# Patient Record
Sex: FEMALE | Race: WHITE | NOT HISPANIC OR LATINO | URBAN - METROPOLITAN AREA
[De-identification: names, ages, dates, MRNs, and addresses within clinical notes are randomized per-mention and may not be internally consistent; named-entity substitution may affect disease eponyms.]

---

## 2023-10-28 RX ORDER — CHOLECALCIFEROL (VITAMIN D3) 25 MCG
1 TABLET ORAL DAILY
COMMUNITY

## 2023-10-30 ENCOUNTER — OFFICE VISIT (OUTPATIENT)
Dept: PRIMARY CARE | Facility: CLINIC | Age: 43
End: 2023-10-30
Payer: COMMERCIAL

## 2023-10-30 VITALS
OXYGEN SATURATION: 100 % | BODY MASS INDEX: 25.84 KG/M2 | HEART RATE: 106 BPM | TEMPERATURE: 97.8 F | WEIGHT: 174.5 LBS | HEIGHT: 69 IN | SYSTOLIC BLOOD PRESSURE: 118 MMHG | RESPIRATION RATE: 20 BRPM | DIASTOLIC BLOOD PRESSURE: 78 MMHG

## 2023-10-30 DIAGNOSIS — Z13.6 ENCOUNTER FOR SCREENING FOR CORONARY ARTERY DISEASE: ICD-10-CM

## 2023-10-30 DIAGNOSIS — R00.2 HEART PALPITATIONS: ICD-10-CM

## 2023-10-30 DIAGNOSIS — Z12.31 ENCOUNTER FOR SCREENING MAMMOGRAM FOR MALIGNANT NEOPLASM OF BREAST: ICD-10-CM

## 2023-10-30 DIAGNOSIS — Z23 ENCOUNTER FOR ADMINISTRATION OF VACCINE: ICD-10-CM

## 2023-10-30 DIAGNOSIS — Z13.1 DIABETES MELLITUS SCREENING: ICD-10-CM

## 2023-10-30 DIAGNOSIS — Z11.3 ROUTINE SCREENING FOR STI (SEXUALLY TRANSMITTED INFECTION): ICD-10-CM

## 2023-10-30 DIAGNOSIS — Z65.9 OTHER SOCIAL STRESSOR: ICD-10-CM

## 2023-10-30 DIAGNOSIS — E04.9 ENLARGED THYROID: ICD-10-CM

## 2023-10-30 DIAGNOSIS — Z71.85 IMMUNIZATION COUNSELING: ICD-10-CM

## 2023-10-30 DIAGNOSIS — Z76.89 ENCOUNTER TO ESTABLISH CARE: Primary | ICD-10-CM

## 2023-10-30 DIAGNOSIS — Z13.220 LIPID SCREENING: ICD-10-CM

## 2023-10-30 DIAGNOSIS — Z11.59 ENCOUNTER FOR HEPATITIS C SCREENING TEST FOR LOW RISK PATIENT: ICD-10-CM

## 2023-10-30 PROCEDURE — 99214 OFFICE O/P EST MOD 30 MIN: CPT | Performed by: FAMILY MEDICINE

## 2023-10-30 PROCEDURE — 4004F PT TOBACCO SCREEN RCVD TLK: CPT | Performed by: FAMILY MEDICINE

## 2023-10-30 PROCEDURE — 93000 ELECTROCARDIOGRAM COMPLETE: CPT | Performed by: FAMILY MEDICINE

## 2023-10-30 PROCEDURE — 90471 IMMUNIZATION ADMIN: CPT | Performed by: FAMILY MEDICINE

## 2023-10-30 PROCEDURE — 93005 ELECTROCARDIOGRAM TRACING: CPT | Performed by: FAMILY MEDICINE

## 2023-10-30 RX ORDER — VITAMIN E MIXED 400 UNIT
CAPSULE ORAL DAILY
COMMUNITY

## 2023-10-30 ASSESSMENT — COLUMBIA-SUICIDE SEVERITY RATING SCALE - C-SSRS
6. HAVE YOU EVER DONE ANYTHING, STARTED TO DO ANYTHING, OR PREPARED TO DO ANYTHING TO END YOUR LIFE?: NO
1. IN THE PAST MONTH, HAVE YOU WISHED YOU WERE DEAD OR WISHED YOU COULD GO TO SLEEP AND NOT WAKE UP?: NO
2. HAVE YOU ACTUALLY HAD ANY THOUGHTS OF KILLING YOURSELF?: NO

## 2023-10-30 ASSESSMENT — PATIENT HEALTH QUESTIONNAIRE - PHQ9
SUM OF ALL RESPONSES TO PHQ9 QUESTIONS 1 AND 2: 0
1. LITTLE INTEREST OR PLEASURE IN DOING THINGS: NOT AT ALL
2. FEELING DOWN, DEPRESSED OR HOPELESS: NOT AT ALL

## 2023-10-30 ASSESSMENT — PAIN SCALES - GENERAL: PAINLEVEL: 0-NO PAIN

## 2023-10-30 ASSESSMENT — ENCOUNTER SYMPTOMS
OCCASIONAL FEELINGS OF UNSTEADINESS: 0
LOSS OF SENSATION IN FEET: 0
DEPRESSION: 0

## 2023-10-30 NOTE — PROGRESS NOTES
Subjective   Estefania Hobbs is a 43 y.o. female who presents as a NEW PATIENT as a transfer of PCP care from Dr. Wilson TO ESTABLISH PCP care with complaints of HEART PALPITATIONS. for Palpitations.    HPI:      42 yo female CURRENT SMOKER (recently 1/2 ppd x 2 months;  1/2 ppd x 10 yeats= 5 pack-years; restarted smoking 2 months ago)  NEW PATIENT as a transfer of PCP care from Dr. Wilson TO ESTABLISH PCP care with complaints of HEART PALPITATIONS.     Last seen by Dr. Wilson 2/2/23 for a PHYSICAL and PAP SMEAR. At visit:  -ordered labs (CBC, CMP, lipid panel, Ucx)  -ordered mammogram==>  -pap completed and sent  -for UTI, PRESCRIBED keflex    After visit 2/2/23 based on test results:  -CBC grossly normal other elevated monocyte %  -lipid panel WNL  -Ucx negative for UTI  -pap cytology: negative for intraepithelial lesion or malignancy.  Cellular evidence parakeratosis usually self-limiting, negative for HPV, continue routine follow-up    Mammogram 3/14/23: Stable mammograms without features to indicate malignancy. Screening  mammography in 1 year is recommended.     ACR BI-RADS 2:  Benign finding.      PMHx:  -dyspareunia==> referred to OBGYN Dr. Staley 2/2/23  -UTI  -history HR HPV    Healthcare Providers:  -prior PCP: Dr. Wilson      Preventive Health Services:  -Last physical: 2/2/23; NEXT DUE 2/2024  -last mammogram: 3/14/23: Stable mammograms without features to indicate malignancy. Screening;mammography in 1 year is recommended. ACR BI-RADS 2:  Benign finding.  NEXT DUE 3/2024  -last colonoscopy: NEVER; due at age 44 years old==> DUE 9/2024  -last STI screening: ?  -Immunizations: Completed Childhood vaccines,   COMPLETED COVID primary vaccine series and booster, Flu vaccine==> NOW DUE        Today Estefania reports  intermittent heart palpitations at rest x 4 years that previously occurred on average once every 2-3 months, but over past 2 -3 weeks experiencing consistent daily  heart palpitations both at  rest and with activity, increasing in frequency and duration over time. She denies that heart palpitations are associated with N/V, diaphoresis, CP, dizziness, confusion, SOB, or COUGH. She denies exertional dyspnea, fatigue,  LE swelling, orthopnea, history of panic attacks, or family history of early AMI or sudden cardiac death.    She also reports:  -previously drinking 6 cups of coffee, but weeks ago but back to 3 cups of coffee since was unsure if coffee intake was influencing heart palpitations. She reports that since cutting back on coffee intake, frequency of heart palpitations remains unchanged.      -increased social stressors x 2 months, due to issues with 16 yo son (grades, not listening, drinking beer).  She states that dynamic in the house is very uncomfortable since her son is not on speaking terms with her  who raised her son.  She expressed that she is interested in referral to psychiatry for therapy to help cope with stress.      Estefania reports no other complaints, issues, or problems today    ROS is NEG for HEADACHE, NAUSEA, VOMITING, DIARRHEA, CHEST PAIN, SOB, and BLEEDING Review of systems is negative for all systems except for any identified issues in HPI above.    LMP: 10/10/23  Contraception: use condoms 100% of the time;   Sexually active with  x 13 years.   Denies history STIs.       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[employer]; [facility] [employer] Disabilities  -tobacco use: CURRENT SMOKER   -alcohol use: socially; 2-3 drinks (beer or wine) once time weekly  -illicits: DENIES    FHx:  Cancer:  -breast cancer: mother, passed in her 50s of metastatic breast cancer   HTN: father  DM: paternal aunts  Heart Disease: father (CAD) s/p stents; mother; maternal  uncles x 3 heart attacks (60s)  Stroke: DENIES  Thyroid Disease: DENIES              Objective     /78   Pulse 106   Temp 36.6 °C (97.8 °F)   Resp 20   Ht  "1.753 m (5' 9\")   Wt 79.2 kg (174 lb 8 oz)   SpO2 100%   BMI 25.77 kg/m²        EK bpm, NSR. No ischemic changes.     COMPLETE PHYSICAL EXAM    GENERAL           General Appearance: pleasant, calm, well-appearing, well-developed, well-hydrated, well-nourished, well-groomed        HEENT           NECK supple, DIFFUSELY ENLARGED THYROID, THYROID NON-TENDER TO PALPABLE, NO THYROID PALPABLE MASSES OR NODULES, Neg for adneopathy Oropharynx normal no exudates. No JVD.       EYES           Pupils: PERRLA, no photophobia.        HEART           Rate and Rhythm regular rate and rhythm. Heart sounds: normal S1S2. Murmurs: none.        LUNGS           Effort: Normal chest wall, no pectus, Normal air entry all fields, Clear to IPPA, RR<16 with no use of accessory muscles.           ABDOMEN           General: Normal to inspection, neg for LKKS or masses and BSs heard in all quadrants                 MUSCULOSKELETAL           gross abnormalities no gross abnormalities, no joint redness or swelling.        EXTREMITIES           Varicose veins: not present. Pulses: 2+ bilateral. Clubbing: none. Cyanosis: no.        NEUROLOGICAL           Orientation: alert and oriented x 3. Grossly normal: yes. Plantars: downgoing bilaterally. Muscle Bulk: normal . Cranial Nerves: CN's II-XII grossly intact.        PSYCHOLOGY           Affect: appropriate. Mood: pleasant.     Assessment/Plan   Problem List Items Addressed This Visit    None  Visit Diagnoses       Encounter to establish care    -  Primary    Relevant Orders    CBC and Auto Differential    Comprehensive metabolic panel    Tsh With Reflex To Free T4 If Abnormal    Urinalysis with Reflex Microscopic    Encounter for screening mammogram for malignant neoplasm of breast        Heart palpitations        Relevant Orders    CBC and Auto Differential    Comprehensive metabolic panel    Tsh With Reflex To Free T4 If Abnormal    Lipid panel    ECG 12 lead (Clinic Performed) (Completed) "    Referral to Cardiology    Lipid screening        Relevant Orders    Lipid panel    Diabetes mellitus screening        Relevant Orders    Hemoglobin A1c    Encounter for hepatitis C screening test for low risk patient        Relevant Orders    Hepatitis C antibody    Routine screening for STI (sexually transmitted infection)        Relevant Orders    HIV 1/2 Antigen/Antibody Screen with Reflex to Confirmation    Syphilis Screen with Reflex    C. trachomatis / N. gonorrhoeae, DNA probe    Trichomonas vaginalis, Amplified    Immunization counseling        Relevant Orders    Flu vaccine (IIV4) age 6 months and greater, preservative free (Completed)    Encounter for screening for coronary artery disease        Relevant Orders    CT cardiac scoring wo IV contrast    Encounter for administration of vaccine        Relevant Orders    Flu vaccine (IIV4) age 6 months and greater, preservative free (Completed)    Other social stressor        Relevant Orders    Referral to Psychiatry    Enlarged thyroid        Relevant Orders    US thyroid          Encounter to Establish care  -labs ordered (CBC, CMP, TSH, lipid panel, HgBA1c, urinalysis; see above)    Heart Palpitations:  -labs ordered (see above: CBC, CMP, TSH, etc)  -EKG ordered/completed: EK bpm, NSR. No ischemic changes.   -CT Heart Ca scoring ordered  -referral to cardiology Dr. Vallabehini ordered  -encouraged to limit caffeine intake  -Emergency Dept and 911/EMS precautions discussed and reviewed    Enlarged thyroid:  -TSH/T4 ordered  -thyroid US ordered    Social stressors:  -referral to  psychiatry  -discussed if has difficulty being seen at  psychiatry or if does nbot accept her insurance, and she prefers to schedule at::  1) [facility] [id number]  OR  2) [facility]: [phone]  -will continue to monitor    Lipid screening  -lipid panel ordered    Diabetes Screening  -hemoglobin A1c ordered    Hep C screening  -Hep C antibody  ordered    Breast Cancer Screening UTD  -mammogram up to date; NEXT DUE 3/2024    STI Screening  -HIV, syphilis, GC/CT, trich ordered    Immunization Counseling  -flu vaccine now now==>RECEIVED TODAY  -recommend updated COVID vaccine that can be obtained at local pharmacy  -TDAP==>? Will discuss at follow up visit    FOLLOW-UP: 4 weeks to discuss and review test results           Bebe Lemus MD, PhD

## 2023-11-09 ENCOUNTER — HOSPITAL ENCOUNTER (OUTPATIENT)
Dept: RADIOLOGY | Facility: HOSPITAL | Age: 43
Discharge: HOME | End: 2023-11-09
Payer: COMMERCIAL

## 2023-11-09 DIAGNOSIS — E04.9 ENLARGED THYROID: ICD-10-CM

## 2023-11-09 PROCEDURE — 76536 US EXAM OF HEAD AND NECK: CPT

## 2023-11-14 ENCOUNTER — HOSPITAL ENCOUNTER (OUTPATIENT)
Dept: RADIOLOGY | Facility: HOSPITAL | Age: 43
Discharge: HOME | End: 2023-11-14
Payer: COMMERCIAL

## 2023-11-14 DIAGNOSIS — Z13.6 ENCOUNTER FOR SCREENING FOR CORONARY ARTERY DISEASE: ICD-10-CM

## 2023-11-14 PROCEDURE — 75571 CT HRT W/O DYE W/CA TEST: CPT

## 2024-01-09 ENCOUNTER — APPOINTMENT (OUTPATIENT)
Dept: PRIMARY CARE | Facility: CLINIC | Age: 44
End: 2024-01-09
Payer: COMMERCIAL

## 2024-01-09 NOTE — PROGRESS NOTES
Subjective   Estefania Hobbs is a 43 y.o. female who presents for FOLLOW-UP VISIT. No chief complaint on file..    HPI:      44 yo female CURRENT SMOKER (recently 1/2 ppd x 2 months;  1/2 ppd x 10 yeats= 5 pack-years; restarted smoking 2 months ago) presenting for FOLLOW-UP VISIT    Last seen by me on 10/30/23 as a NEW PATIENT VISIT as a transfer of PCP care from Dr. Wilson TO ESTABLISH PCP care with complaints of HEART PALPITATIONS. At visit:  -ORDERED LABS==> NOT YET COMPLETED  -EK bpm, NSR. No ischemic changes.    -ORDERED CT Heart Ca scoring==> COMPLETED 23  -ORDERED referral to cardiology Dr. Vallabehini==>  -ORDERED thyroid US for enlarged thyroid==> COMPLETED 23  -ORDERED referral to  psychiatry for multiple social stressors  -discussed if has difficulty being seen at  psychiatry or if does not accept her insurance, and she prefers to schedule at::  1) [email or web address] [phone]  OR  2) [facility]: [phone]  -encouraged to limit caffeine intake  -Emergency Dept and 911/EMS precautions discussed and reviewed  -RECEIVED FLU vaccine  -TDAP==>? Will discuss at follow up visit  FOLLOW-UP: 4 weeks to discuss and review test results      After visit on 10/20/23 based on test results:    -CT Heart Ca scoring (23) :   Coronary calcium score =0;  very low risk of coronary artery disease     -Thyroid US(23):   Slightly increased vascularity of the thyroid gland bilaterally;  otherwise unremarkable exam==>Will repeat thyroid US in 6-12 months to monitor; NEXT DUE 2024        Last seen by Dr. Wilson 23 for a PHYSICAL and PAP SMEAR. At visit:  -ordered labs (CBC, CMP, lipid panel, Ucx)  -ordered mammogram==>  -pap completed and sent  -for UTI, PRESCRIBED keflex     After visit 23 based on test results:  -CBC grossly normal other elevated monocyte %  -lipid panel WNL  -Ucx negative for UTI  -pap cytology: negative for intraepithelial lesion or malignancy.   Cellular evidence parakeratosis usually self-limiting, negative for HPV, continue routine follow-up     Mammogram 3/14/23: Stable mammograms without features to indicate malignancy. Screening  mammography in 1 year is recommended.     ACR BI-RADS 2:  Benign finding.        PMHx:  -dyspareunia==> referred to OBGYN Dr. Staley 2/2/23  -UTI  -history HR HPV  -social sfrtessors==> previously referred to psychiatry 10/20/23     Healthcare Providers:  -prior PCP: Dr. Wilson        Preventive Health Services:  -Last physical: 2/2/23; NEXT DUE 2/2024  -last mammogram: 3/14/23: Stable mammograms without features to indicate malignancy. Screening;mammography in 1 year is recommended. ACR BI-RADS 2:  Benign finding.  NEXT DUE 3/2024  -last colonoscopy: NEVER; due at age 44 years old==> DUE 9/2024  -last STI screening: ?  -Immunizations: Completed Childhood vaccines,   COMPLETED COVID primary vaccine series and booster, Flu vaccine==> RECEIVED 10/30/23       Immunization History   Administered Date(s) Administered    Flu vaccine (IIV4), preservative free *Check age/dose* 10/15/2022, 10/30/2023    Hepatitis B vaccine, adult (RECOMBIVAX, ENGERIX) 09/18/2008, 10/23/2008, 03/12/2009    Influenza, injectable, quadrivalent 10/23/2020, 11/23/2021    Pfizer COVID-19 vaccine, bivalent, age 12 years and older (30 mcg/0.3 mL) 10/15/2022    Pfizer Purple Cap SARS-CoV-2 04/06/2021, 05/04/2021, 11/26/2021           INTERVAL HISTORY:        Today Estefania reports:      intermittent heart palpitations at rest x 4 years that previously occurred on average once every 2-3 months, but over past 2 -3 weeks experiencing consistent daily  heart palpitations both at rest and with activity, increasing in frequency and duration over time. She denies that heart palpitations are associated with N/V, diaphoresis, CP, dizziness, confusion, SOB, or COUGH. She denies exertional dyspnea, fatigue,  LE swelling, orthopnea, history of panic attacks, or family  history of early AMI or sudden cardiac death.     -previously drinking 6 cups of coffee, but weeks ago but back to 3 cups of coffee since was unsure if coffee intake was influencing heart palpitations. She reports that since cutting back on coffee intake, frequency of heart palpitations remains unchanged.        -increased social stressors x 2 months, due to issues with 16 yo son (grades, not listening, drinking beer).  She states that dynamic in the house is very uncomfortable since her son is not on speaking terms with her  who raised her son.  She expressed that she is interested in referral to psychiatry for therapy to help cope with stress.        Estefania reports no other complaints, issues, or problems today     ROS is NEG for HEADACHE, NAUSEA, VOMITING, DIARRHEA, CHEST PAIN, SOB, and BLEEDING Review of systems is negative for all systems except for any identified issues in HPI above.     LMP:   Contraception: use condoms 100% of the time;   Sexually active with  x 13 years.   Denies history STIs.         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[employer]; [facility] [employer] Disabilities  -tobacco use: CURRENT SMOKER   -alcohol use: socially; 2-3 drinks (beer or wine) once time weekly  -illicits: DENIES     FHx:  Cancer:  -breast cancer: mother, passed in her 50s of metastatic breast cancer   HTN: father  DM: paternal aunts  Heart Disease: father (CAD) s/p stents; mother; maternal  uncles x 3 heart attacks (60s)  Stroke: DENIES  Thyroid Disease: DENIES      Objective     There were no vitals taken for this visit.     Physical Examination:       GENERAL           General Appearance: well-appearing, well-developed, well-hydrated, well-nourished, no acute distress.        HEENT           NECK supple, DIFFUSELY ENLARGED THYROID, THYROID NON-TENDER TO PALPABLE, NO THYROID PALPABLE MASSES OR NODULES, Neg for adneopathy Oropharynx normal no exudates.  No JVD. no carotid bruit.        EYES           Extraocular Movements: normal, bilateral eyes PUNEET, no conjunctival injection.        HEART           Rate and Rhythm regular rate and rhythm. Heart sounds: normal S1S2, no S3 or S4. Murmurs: none.        CHEST           Breath sounds: Clear to IPPA, RR<16 no use of accessory muscles.        ABDOMEN           General: Neg for LKKS or masses, no scleral icterus or jaundice.        MUSCULOSKELETAL           Joints Demonstration: Neg for erythema, swelling or joint deformities. gross abnormalities no gross abnormalities.        EXTREMITIES           Lower Extremities: Neg for cyanosis, clubbing or edema.       Assessment/Plan   Problem List Items Addressed This Visit    None      Heart Palpitations: CT Heart Ca scoring (23)=0, low risk of CAD  -labs previously ordered (CBC, CMP, TSH, etc) 10/20/23==> NOT YET COMPLETED. Discussed with patient to please go to [facility], [place], operated by [facility] or another  lab to complete previously ordered labs.  -EKG 10/30/23: EK bpm, NSR. No ischemic changes.   -previously ordered referral to cardiology Dr. Vallabehini on 10/30/23  -encouraged to limit caffeine intake  -Emergency Dept and 911/EMS precautions discussed and reviewed     Enlarged thyroid: completed thyroid US 23  that lightly increased vascularity of the thyroid gland bilaterally;  otherwise unremarkable exax  -Will repeat thyroid US in 6-12 months to monitor; NEXT DUE 2024     Social stressors:  -referral to  psychiatry  -discussed if has difficulty being seen at  psychiatry or if does nbot accept her insurance, and she prefers to schedule at::  1) [facility] [phone]  OR  2) [facility]: [phone]  -will continue to monitor     Lipid screening  -lipid panel previously  ordered 10/30/23==>  NOT YET COMPLETED. Discussed with patient to please go to [facility], [place], operated by [facility] or another  lab to complete previously ordered labs.     Diabetes  Screening  -hemoglobin A1c previously ordered 10/30/23==> NOT YET COMPLETED. Discussed with patient to please go to [facility] or another  lab to complete previously ordered labs.     Hep C screening  -Hep C antibody ordered     Breast Cancer Screening UTD  -mammogram up to date; NEXT DUE 3/2024     STI Screening  -HIV, syphilis, GC/CT, trich previously ordered 10/30/23==> NOT YET COMPLETED. Discussed with patient to please go to [facility] or another  lab to complete previously ordered labs.     Immunization Counseling  -flu vaccine RECEIVED 10/30/23  -recommend updated COVID vaccine that can be obtained at local pharmacy  -TDAP NOW DUE==>     FOLLOW-UP: 4 weeks to discuss and review test results            Bebe Lemus MD, PhD

## 2024-02-05 ASSESSMENT — PROMIS GLOBAL HEALTH SCALE
RATE_SOCIAL_SATISFACTION: GOOD
CARRYOUT_SOCIAL_ACTIVITIES: VERY GOOD
RATE_AVERAGE_FATIGUE: MILD
EMOTIONAL_PROBLEMS: SOMETIMES
RATE_AVERAGE_PAIN: 3
RATE_MENTAL_HEALTH: GOOD
CARRYOUT_PHYSICAL_ACTIVITIES: MOSTLY
RATE_PHYSICAL_HEALTH: FAIR
RATE_GENERAL_HEALTH: GOOD
RATE_QUALITY_OF_LIFE: GOOD
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
## 2024-02-09 ENCOUNTER — OFFICE VISIT (OUTPATIENT)
Dept: PRIMARY CARE | Facility: CLINIC | Age: 44
End: 2024-02-09
Payer: COMMERCIAL

## 2024-02-09 VITALS
OXYGEN SATURATION: 100 % | WEIGHT: 177.2 LBS | SYSTOLIC BLOOD PRESSURE: 110 MMHG | DIASTOLIC BLOOD PRESSURE: 70 MMHG | HEART RATE: 84 BPM | RESPIRATION RATE: 18 BRPM | BODY MASS INDEX: 26.17 KG/M2 | TEMPERATURE: 97 F

## 2024-02-09 DIAGNOSIS — Z11.59 ENCOUNTER FOR HEPATITIS C SCREENING TEST FOR LOW RISK PATIENT: ICD-10-CM

## 2024-02-09 DIAGNOSIS — Z12.4 CERVICAL CANCER SCREENING: ICD-10-CM

## 2024-02-09 DIAGNOSIS — Z13.1 DIABETES MELLITUS SCREENING: ICD-10-CM

## 2024-02-09 DIAGNOSIS — Z12.31 ENCOUNTER FOR SCREENING MAMMOGRAM FOR MALIGNANT NEOPLASM OF BREAST: ICD-10-CM

## 2024-02-09 DIAGNOSIS — E55.9 VITAMIN D DEFICIENCY: ICD-10-CM

## 2024-02-09 DIAGNOSIS — M54.50 CHRONIC RIGHT-SIDED LOW BACK PAIN WITHOUT SCIATICA: ICD-10-CM

## 2024-02-09 DIAGNOSIS — M62.838 MUSCLE SPASM: ICD-10-CM

## 2024-02-09 DIAGNOSIS — Z71.85 IMMUNIZATION COUNSELING: ICD-10-CM

## 2024-02-09 DIAGNOSIS — M25.551 RIGHT HIP PAIN: Primary | ICD-10-CM

## 2024-02-09 DIAGNOSIS — Z00.00 PHYSICAL EXAM: ICD-10-CM

## 2024-02-09 DIAGNOSIS — G89.29 CHRONIC RIGHT-SIDED LOW BACK PAIN WITHOUT SCIATICA: ICD-10-CM

## 2024-02-09 DIAGNOSIS — Z13.220 LIPID SCREENING: ICD-10-CM

## 2024-02-09 DIAGNOSIS — Z65.9 OTHER SOCIAL STRESSOR: ICD-10-CM

## 2024-02-09 DIAGNOSIS — E04.9 ENLARGED THYROID: ICD-10-CM

## 2024-02-09 DIAGNOSIS — R00.2 HEART PALPITATIONS: ICD-10-CM

## 2024-02-09 DIAGNOSIS — Z11.3 ROUTINE SCREENING FOR STI (SEXUALLY TRANSMITTED INFECTION): ICD-10-CM

## 2024-02-09 PROCEDURE — 99214 OFFICE O/P EST MOD 30 MIN: CPT | Performed by: FAMILY MEDICINE

## 2024-02-09 PROCEDURE — 4004F PT TOBACCO SCREEN RCVD TLK: CPT | Performed by: FAMILY MEDICINE

## 2024-02-09 RX ORDER — CYCLOBENZAPRINE HCL 5 MG
5 TABLET ORAL 3 TIMES DAILY PRN
Qty: 30 TABLET | Refills: 1 | Status: SHIPPED | OUTPATIENT
Start: 2024-02-09 | End: 2024-04-09

## 2024-02-09 ASSESSMENT — ENCOUNTER SYMPTOMS
LOSS OF SENSATION IN FEET: 0
OCCASIONAL FEELINGS OF UNSTEADINESS: 0
DEPRESSION: 0

## 2024-02-09 ASSESSMENT — PATIENT HEALTH QUESTIONNAIRE - PHQ9
2. FEELING DOWN, DEPRESSED OR HOPELESS: NOT AT ALL
SUM OF ALL RESPONSES TO PHQ9 QUESTIONS 1 AND 2: 0
1. LITTLE INTEREST OR PLEASURE IN DOING THINGS: NOT AT ALL

## 2024-02-09 ASSESSMENT — PAIN SCALES - GENERAL: PAINLEVEL: 2

## 2024-02-09 NOTE — PATIENT INSTRUCTIONS
It was nice seeing you today.    Please take flexeril 5 mg as need for muscle spasms, I recommend to take at night since they can make you feel sleepy and apply heat to your hip and back with a heating pad    Please go to [facility] lab or another [facility] lab facility to complete the lab testing that I ordered     Please go to [facility] and walk in to have your xrays completed that can be done at the same time you have your labs drawn    Please call radiology to schedule your mammogram that is due Match 2024    Please call referral line to schedule: 1)  GYN appointment for pap smear and well woman visit and 2) psychiatry appointment; 3) physical therapy    I recommend receiving the TDAP booster that prevents tetanus and other infectious disease    I recommend the updated COVID vaccine that can be obtained at your local pharmacy    Please schedule a follow up with me in 4  weeks to discuss and review your test results and then 8 weeks for a physical

## 2024-02-12 ENCOUNTER — HOSPITAL ENCOUNTER (OUTPATIENT)
Dept: RADIOLOGY | Facility: HOSPITAL | Age: 44
Discharge: HOME | End: 2024-02-12

## 2024-02-12 ENCOUNTER — HOSPITAL ENCOUNTER (OUTPATIENT)
Dept: RADIOLOGY | Facility: HOSPITAL | Age: 44
Discharge: HOME | End: 2024-02-12
Payer: COMMERCIAL

## 2024-02-12 ENCOUNTER — ANCILLARY ORDERS (OUTPATIENT)
Dept: PRIMARY CARE | Facility: CLINIC | Age: 44
End: 2024-02-12

## 2024-02-12 ENCOUNTER — LAB (OUTPATIENT)
Dept: LAB | Facility: LAB | Age: 44
End: 2024-02-12
Payer: COMMERCIAL

## 2024-02-12 DIAGNOSIS — M25.551 RIGHT HIP PAIN: ICD-10-CM

## 2024-02-12 DIAGNOSIS — M54.50 CHRONIC RIGHT-SIDED LOW BACK PAIN WITHOUT SCIATICA: ICD-10-CM

## 2024-02-12 DIAGNOSIS — M25.551 RIGHT HIP PAIN: Primary | ICD-10-CM

## 2024-02-12 DIAGNOSIS — Z13.220 LIPID SCREENING: ICD-10-CM

## 2024-02-12 DIAGNOSIS — E55.9 VITAMIN D DEFICIENCY: ICD-10-CM

## 2024-02-12 DIAGNOSIS — Z13.1 DIABETES MELLITUS SCREENING: ICD-10-CM

## 2024-02-12 DIAGNOSIS — G89.29 CHRONIC RIGHT-SIDED LOW BACK PAIN WITHOUT SCIATICA: ICD-10-CM

## 2024-02-12 DIAGNOSIS — R82.90 ABNORMAL URINALYSIS: Primary | ICD-10-CM

## 2024-02-12 DIAGNOSIS — M62.838 MUSCLE SPASM: ICD-10-CM

## 2024-02-12 DIAGNOSIS — Z00.00 PHYSICAL EXAM: ICD-10-CM

## 2024-02-12 DIAGNOSIS — Z11.3 ROUTINE SCREENING FOR STI (SEXUALLY TRANSMITTED INFECTION): ICD-10-CM

## 2024-02-12 DIAGNOSIS — Z11.59 ENCOUNTER FOR HEPATITIS C SCREENING TEST FOR LOW RISK PATIENT: ICD-10-CM

## 2024-02-12 DIAGNOSIS — Z76.89 ENCOUNTER TO ESTABLISH CARE: ICD-10-CM

## 2024-02-12 DIAGNOSIS — R00.2 HEART PALPITATIONS: ICD-10-CM

## 2024-02-12 DIAGNOSIS — Z12.4 CERVICAL CANCER SCREENING: ICD-10-CM

## 2024-02-12 DIAGNOSIS — E04.9 ENLARGED THYROID: ICD-10-CM

## 2024-02-12 DIAGNOSIS — Z65.9 OTHER SOCIAL STRESSOR: ICD-10-CM

## 2024-02-12 DIAGNOSIS — Z71.85 IMMUNIZATION COUNSELING: ICD-10-CM

## 2024-02-12 DIAGNOSIS — Z12.31 ENCOUNTER FOR SCREENING MAMMOGRAM FOR MALIGNANT NEOPLASM OF BREAST: ICD-10-CM

## 2024-02-12 LAB
25(OH)D3 SERPL-MCNC: 29 NG/ML (ref 31–100)
ALBUMIN SERPL-MCNC: 4.3 G/DL (ref 3.5–5)
ALP BLD-CCNC: 56 U/L (ref 35–125)
ALT SERPL-CCNC: 16 U/L (ref 5–40)
ANION GAP SERPL CALC-SCNC: 10 MMOL/L
APPEARANCE UR: CLEAR
AST SERPL-CCNC: 15 U/L (ref 5–40)
BASOPHILS # BLD AUTO: 0.04 X10*3/UL (ref 0–0.1)
BASOPHILS NFR BLD AUTO: 0.7 %
BILIRUB SERPL-MCNC: 0.4 MG/DL (ref 0.1–1.2)
BILIRUB UR STRIP.AUTO-MCNC: NEGATIVE MG/DL
BUN SERPL-MCNC: 13 MG/DL (ref 8–25)
CALCIUM SERPL-MCNC: 9.6 MG/DL (ref 8.5–10.4)
CHLORIDE SERPL-SCNC: 101 MMOL/L (ref 97–107)
CHOLEST SERPL-MCNC: 177 MG/DL (ref 133–200)
CHOLEST/HDLC SERPL: 3.5 {RATIO}
CO2 SERPL-SCNC: 26 MMOL/L (ref 24–31)
COLOR UR: YELLOW
CREAT SERPL-MCNC: 0.8 MG/DL (ref 0.4–1.6)
EGFRCR SERPLBLD CKD-EPI 2021: >90 ML/MIN/1.73M*2
EOSINOPHIL # BLD AUTO: 0.21 X10*3/UL (ref 0–0.7)
EOSINOPHIL NFR BLD AUTO: 3.5 %
ERYTHROCYTE [DISTWIDTH] IN BLOOD BY AUTOMATED COUNT: 12.6 % (ref 11.5–14.5)
EST. AVERAGE GLUCOSE BLD GHB EST-MCNC: 103 MG/DL
GLUCOSE SERPL-MCNC: 86 MG/DL (ref 65–99)
GLUCOSE UR STRIP.AUTO-MCNC: NORMAL MG/DL
HBA1C MFR BLD: 5.2 %
HCT VFR BLD AUTO: 42 % (ref 36–46)
HDLC SERPL-MCNC: 51 MG/DL
HGB BLD-MCNC: 13.8 G/DL (ref 12–16)
HIV 1+2 AB+HIV1 P24 AG SERPL QL IA: NONREACTIVE
IMM GRANULOCYTES # BLD AUTO: 0.01 X10*3/UL (ref 0–0.7)
IMM GRANULOCYTES NFR BLD AUTO: 0.2 % (ref 0–0.9)
KETONES UR STRIP.AUTO-MCNC: NEGATIVE MG/DL
LDLC SERPL CALC-MCNC: 107 MG/DL (ref 65–130)
LEUKOCYTE ESTERASE UR QL STRIP.AUTO: NEGATIVE
LYMPHOCYTES # BLD AUTO: 1.6 X10*3/UL (ref 1.2–4.8)
LYMPHOCYTES NFR BLD AUTO: 26.9 %
MCH RBC QN AUTO: 31.2 PG (ref 26–34)
MCHC RBC AUTO-ENTMCNC: 32.9 G/DL (ref 32–36)
MCV RBC AUTO: 95 FL (ref 80–100)
MONOCYTES # BLD AUTO: 0.53 X10*3/UL (ref 0.1–1)
MONOCYTES NFR BLD AUTO: 8.9 %
NEUTROPHILS # BLD AUTO: 3.56 X10*3/UL (ref 1.2–7.7)
NEUTROPHILS NFR BLD AUTO: 59.8 %
NITRITE UR QL STRIP.AUTO: NEGATIVE
NRBC BLD-RTO: 0 /100 WBCS (ref 0–0)
PH UR STRIP.AUTO: 6.5 [PH]
PLATELET # BLD AUTO: 356 X10*3/UL (ref 150–450)
POTASSIUM SERPL-SCNC: 4.4 MMOL/L (ref 3.4–5.1)
PROT SERPL-MCNC: 6.7 G/DL (ref 5.9–7.9)
PROT UR STRIP.AUTO-MCNC: NEGATIVE MG/DL
RBC # BLD AUTO: 4.43 X10*6/UL (ref 4–5.2)
RBC # UR STRIP.AUTO: ABNORMAL /UL
RBC #/AREA URNS AUTO: NORMAL /HPF
SODIUM SERPL-SCNC: 137 MMOL/L (ref 133–145)
SP GR UR STRIP.AUTO: 1.02
SQUAMOUS #/AREA URNS AUTO: NORMAL /HPF
TREPONEMA PALLIDUM IGG+IGM AB [PRESENCE] IN SERUM OR PLASMA BY IMMUNOASSAY: NONREACTIVE
TRIGL SERPL-MCNC: 96 MG/DL (ref 40–150)
TSH SERPL DL<=0.05 MIU/L-ACNC: 1.9 MIU/L (ref 0.27–4.2)
UROBILINOGEN UR STRIP.AUTO-MCNC: NORMAL MG/DL
WBC # BLD AUTO: 6 X10*3/UL (ref 4.4–11.3)
WBC #/AREA URNS AUTO: NORMAL /HPF

## 2024-02-12 PROCEDURE — 86780 TREPONEMA PALLIDUM: CPT

## 2024-02-12 PROCEDURE — 73502 X-RAY EXAM HIP UNI 2-3 VIEWS: CPT | Mod: RT

## 2024-02-12 PROCEDURE — 82306 VITAMIN D 25 HYDROXY: CPT

## 2024-02-12 PROCEDURE — 87389 HIV-1 AG W/HIV-1&-2 AB AG IA: CPT

## 2024-02-12 PROCEDURE — 72110 X-RAY EXAM L-2 SPINE 4/>VWS: CPT

## 2024-02-12 PROCEDURE — 85025 COMPLETE CBC W/AUTO DIFF WBC: CPT

## 2024-02-12 PROCEDURE — 84443 ASSAY THYROID STIM HORMONE: CPT

## 2024-02-12 PROCEDURE — 86803 HEPATITIS C AB TEST: CPT

## 2024-02-12 PROCEDURE — 80053 COMPREHEN METABOLIC PANEL: CPT

## 2024-02-12 PROCEDURE — 72220 X-RAY EXAM SACRUM TAILBONE: CPT

## 2024-02-12 PROCEDURE — 83036 HEMOGLOBIN GLYCOSYLATED A1C: CPT

## 2024-02-12 PROCEDURE — 80061 LIPID PANEL: CPT

## 2024-02-12 PROCEDURE — 81001 URINALYSIS AUTO W/SCOPE: CPT

## 2024-02-12 PROCEDURE — 36415 COLL VENOUS BLD VENIPUNCTURE: CPT

## 2024-02-13 LAB — HCV AB SER QL: NONREACTIVE

## 2024-02-14 ENCOUNTER — TELEPHONE (OUTPATIENT)
Dept: PRIMARY CARE | Facility: CLINIC | Age: 44
End: 2024-02-14
Payer: COMMERCIAL

## 2024-02-14 DIAGNOSIS — Z11.3 ROUTINE SCREENING FOR STI (SEXUALLY TRANSMITTED INFECTION): Primary | ICD-10-CM

## 2024-02-14 NOTE — TELEPHONE ENCOUNTER
Calling with canceled lab tests gonorrhea and chlamydia-attempted to add on to urine that was too old. Need to resubmit new sample.

## 2024-02-29 ENCOUNTER — TRANSCRIBE ORDERS (OUTPATIENT)
Dept: ORTHOPEDIC SURGERY | Facility: HOSPITAL | Age: 44
End: 2024-02-29
Payer: COMMERCIAL

## 2024-02-29 DIAGNOSIS — M54.50 LOW BACK PAIN, UNSPECIFIED BACK PAIN LATERALITY, UNSPECIFIED CHRONICITY, UNSPECIFIED WHETHER SCIATICA PRESENT: ICD-10-CM

## 2024-03-01 ENCOUNTER — OFFICE VISIT (OUTPATIENT)
Dept: ORTHOPEDIC SURGERY | Facility: CLINIC | Age: 44
End: 2024-03-01
Payer: COMMERCIAL

## 2024-03-01 ENCOUNTER — OFFICE VISIT (OUTPATIENT)
Dept: OBSTETRICS AND GYNECOLOGY | Facility: CLINIC | Age: 44
End: 2024-03-01
Payer: COMMERCIAL

## 2024-03-01 VITALS
BODY MASS INDEX: 26.36 KG/M2 | WEIGHT: 178 LBS | DIASTOLIC BLOOD PRESSURE: 83 MMHG | HEIGHT: 69 IN | SYSTOLIC BLOOD PRESSURE: 123 MMHG

## 2024-03-01 VITALS — WEIGHT: 170 LBS | HEIGHT: 69 IN | BODY MASS INDEX: 25.18 KG/M2

## 2024-03-01 DIAGNOSIS — G89.29 CHRONIC RIGHT-SIDED LOW BACK PAIN WITHOUT SCIATICA: ICD-10-CM

## 2024-03-01 DIAGNOSIS — Z12.4 CERVICAL CANCER SCREENING: ICD-10-CM

## 2024-03-01 DIAGNOSIS — Z12.31 ENCOUNTER FOR SCREENING MAMMOGRAM FOR MALIGNANT NEOPLASM OF BREAST: ICD-10-CM

## 2024-03-01 DIAGNOSIS — Z01.419 ENCOUNTER FOR ANNUAL ROUTINE GYNECOLOGICAL EXAMINATION: Primary | ICD-10-CM

## 2024-03-01 DIAGNOSIS — M54.50 CHRONIC RIGHT-SIDED LOW BACK PAIN WITHOUT SCIATICA: ICD-10-CM

## 2024-03-01 DIAGNOSIS — M51.36 DISCOGENIC LOW BACK PAIN: Primary | ICD-10-CM

## 2024-03-01 PROCEDURE — 4004F PT TOBACCO SCREEN RCVD TLK: CPT

## 2024-03-01 PROCEDURE — 99204 OFFICE O/P NEW MOD 45 MIN: CPT | Performed by: ORTHOPAEDIC SURGERY

## 2024-03-01 PROCEDURE — 4004F PT TOBACCO SCREEN RCVD TLK: CPT | Performed by: ORTHOPAEDIC SURGERY

## 2024-03-01 PROCEDURE — 99386 PREV VISIT NEW AGE 40-64: CPT

## 2024-03-01 RX ORDER — PREDNISONE 10 MG/1
TABLET ORAL
Qty: 43 TABLET | Refills: 0 | Status: SHIPPED | OUTPATIENT
Start: 2024-03-01 | End: 2024-03-11

## 2024-03-01 ASSESSMENT — ENCOUNTER SYMPTOMS
SHORTNESS OF BREATH: 0
OCCASIONAL FEELINGS OF UNSTEADINESS: 0
VOMITING: 0
DEPRESSION: 0
FEVER: 0
COLOR CHANGE: 0
HEADACHES: 0
DYSURIA: 0
ABDOMINAL PAIN: 0
COUGH: 0
CHILLS: 0
LOSS OF SENSATION IN FEET: 0
DIZZINESS: 0
FATIGUE: 0
UNEXPECTED WEIGHT CHANGE: 0
NAUSEA: 0

## 2024-03-01 ASSESSMENT — PATIENT HEALTH QUESTIONNAIRE - PHQ9
2. FEELING DOWN, DEPRESSED OR HOPELESS: NOT AT ALL
SUM OF ALL RESPONSES TO PHQ9 QUESTIONS 1 & 2: 0
1. LITTLE INTEREST OR PLEASURE IN DOING THINGS: NOT AT ALL

## 2024-03-01 ASSESSMENT — LIFESTYLE VARIABLES
AUDIT-C TOTAL SCORE: 2
HOW OFTEN DO YOU HAVE A DRINK CONTAINING ALCOHOL: 2-4 TIMES A MONTH
SKIP TO QUESTIONS 9-10: 1
HOW OFTEN DO YOU HAVE SIX OR MORE DRINKS ON ONE OCCASION: NEVER
HOW MANY STANDARD DRINKS CONTAINING ALCOHOL DO YOU HAVE ON A TYPICAL DAY: 1 OR 2

## 2024-03-01 ASSESSMENT — PAIN - FUNCTIONAL ASSESSMENT: PAIN_FUNCTIONAL_ASSESSMENT: NO/DENIES PAIN

## 2024-03-01 ASSESSMENT — PAIN SCALES - GENERAL: PAINLEVEL: 0-NO PAIN

## 2024-03-01 NOTE — PROGRESS NOTES
HPI:Estefania Hobbs is a 43-year-old woman who comes in with complaints of low back pain.  She has had several episodes of acute low back pain over the course the last 12 months.  They are self-limited.  She denies any radicular pain.  She does do yoga on a regular basis.  She has not had oral steroids and/or physical therapy.  She denies constitutional symptoms.      ROS:  Reviewed on EMR and patient intake sheet.    PMH/SH:  Reviewed on EMR and patient intake sheet.    Exam:  Physical Exam    Constitutional: Well appearing; no acute distress  Eyes: pupils are equal and round  Psych: normal affect  Respiratory: non-labored breathing  Cardiovascular: regular rate and rhythm  GI: non-distended abdomen  Musculoskeletal: no pain with range of motion of the hips bilaterally  Neurologic: [5]/5 strength in the lower extremities bilaterally]; [negative] straight leg raise    Radiology:     X-rays demonstrate moderate disc degeneration L5-S1    Diagnosis:    Discogenic low back pain    Assessment and Plan:   43-year-old woman, with discogenic back pain.  The natural history of disc degeneration was discussed at length.  I stressed that the natural history of disc degeneration is usually favorable, with pain and disability decreasing over time when treated with a multi-modal, focused rehabilitation program.  I encouraged, therefore, continued non-operative care, focusing on core strengthening, regular cardiovascular exercise, abstaining from nicotine products, and maintaining a healthy weight.    I recommended prednisone taper, and physical therapy for core strengthening.  I can see her back as needed.    The patient was in agreement with the plan. At the end of the visit today, the patient felt that all questions had been answered satisfactorily.  The patient was pleased with the visit and very appreciative for the care rendered.     Thank you very much for the kind referral.  It is a privilege, and a pleasure, to partner with  you in the care of your patients.  I would be delighted to assist you with any further consultations as needed.          Sp Mercer MD    Chief of Spine Surgery, [facility]  Director of Spine Service, [facility]  , Department of Orthopaedics  [facility] [employer] [facility]  [address] [place] [address]  P: [phone]  [email or web address]    This note was dictated with voice recognition software.  It has not been proofread for grammatical errors, typographical mistakes or other semantic inconsistencies.

## 2024-03-01 NOTE — PROGRESS NOTES
"Subjective   Estefania Hobbs is a 43 y.o. female who is here for a routine GYN exam as a new patient, referred by her PCP Dr. Lemus, and to establish GYN care. She has previously had her paps done by her prior PCP Dr. Wilson. Menses are regular, once monthly, 4-5 days duration, cramps leading up to menses onset and days 1-2, heavier bleeds day 1-2 but not soaking products <1 hour; uses Pamprin prn. Denies breast changes or concerns - does admit to cyclical breast tenderness, usually LEFT in UOQ, starts few days prior to menses onset, usually resolves with menstrual bleed.     Complaints:   none  Periods: regular  Dysmenorrhea:  none    Current contraception: condom  History of abnormal Pap smear: no  History of abnormal mammogram: no      OB History          4    Para   1    Term   1            AB   3    Living   1         SAB        IAB        Ectopic        Multiple        Live Births   1                  Review of Systems   Constitutional:  Negative for chills, fatigue, fever and unexpected weight change.   Respiratory:  Negative for cough and shortness of breath.    Gastrointestinal:  Negative for abdominal pain, nausea and vomiting.   Genitourinary:  Negative for dyspareunia, dysuria, pelvic pain and vaginal discharge.   Skin:  Negative for color change and rash.   Neurological:  Negative for dizziness and headaches.       Objective   /83   Ht 1.753 m (5' 9\")   Wt 80.7 kg (178 lb)   LMP 2024   BMI 26.29 kg/m²        General:   Alert and oriented, in no acute distress   Neck: Supple. No visible thyromegaly.    Breast/Axilla: Normal to palpation bilaterally without masses, skin changes, or nipple discharge.    Abdomen: Soft, non-tender, without masses or organomegaly   Vulva: Normal architecture without erythema, masses, or lesions.    Vagina: Normal mucosa without lesions, masses, or atrophy. No abnormal vaginal discharge.    Cervix: Normal without masses, lesions, or signs of " cervicitis   Uterus: Normal, mobile, non-enlarged uterus   Adnexa: Normal without masses or lesions   Pelvic Floor Normal    Psych Normal affect. Normal mood.      Assessment/Plan   -UTD on pap smear, next due 2/2026.  -Сергей appt scheduled for 3/15/24.  -Continue monitoring and tracking menses.   -Recommended supportive bras, limiting caffeine intake, and can use warm compresses/ibuprofen as needed prior to menses onset if and when cyclical breast tenderness returns. Otherwise, plan to update сергей at this time.    Kathy Newby PA-C

## 2024-03-07 NOTE — PROGRESS NOTES
Subjective   Estefania Hobbs is a 43 y.o. female who presents for FOLLOW-Up VISIT to DISCUSS and REVIEW TEST RESULTS.    HPI:    44 yo female CURRENT SMOKER (1/2 ppd x 10 years= 5 pack-years)  presents for FOLLOW-Up VISIT to DISCUSS and REVIEW TEST RESULTS.    Last seen by me on 2/9/24 for a  PHYSICAL. At visit:  -ORDERED LABS==>COMPLETED; did not COMPLETE URINE STI testing  -ORDERED mammogram (due 3/3024)==>NOT YET COMPLETED  -Intermittent Right hip and lower back pain x 2 months after lifting heaving object. Most consistent with muscular spasms. History of right hip trauma (2105). MSK  exam WNL, other than tenderness to palpation of RIGHT lumbar paraspinal muscles. Neuro exam WNL. No red flag Sxs. Suspect muscles spasms and strain.  R/o hip and lumbar spine structural abnormalities  -lumbar spine and sacrum/coccyx XR ordered==> COMPLETED  -Righ hip XR ordered==> COMPLETED  -referral to PT ordered  -flexeril 5 mg prn at night before bedtime  - patient advised to apply heating pad to lumbar back muscles and OTC NSAIDs q 8h prn or tylenol for discomfort. Patient counseled on limiting NSAID use since long term NSAID use can cause irreversible renal damage  -Social stressors: improving; saw psychiatry at [facility] and will call to schedule follow-up visit: previously referral to  psychiatry 10/30/23; discussed if has difficulty being seen at  psychiatry or if does not accept her insurance, and she prefers to schedule at:: 1) [facility] [phone] OR 2) [facility]: [phone]  -ORDERED REFERRAL to GYN to establish well woman care and for pap smear  -flu vaccine received 10/30/23  -TDAP now due==>   -recommend updated COVID spike vaccine that can be obtained at local pharmacy   -FOLLOW-UP: 4 weeks to discuss and review test results      After visit on 2/9/24 based on test results:  -abnormal blood in urine (3-5 red blood cells)==> patient was on menses at time of UA==> re-ordered UA==> NOT YET  COMPLETED    -low Vit D levels: patient advised to please start over the counter Vitamin D3 2,000 units daily     -HIV, syphilis, and Hep C negative     -normal thyroid function     -normal liver and kidney function, normal glucose and electrolytes     -good cholesterol control. Advised patient that I Recommend starting omega 3 fish oil 1,000 mg daily to improve HDL (good cholesterol) levels     -HgBA1c normal: no prediabetes and no diabetes     -normal blood counts    RIGHT HIP and PELVIS 24:  FINDINGS:  Normal mineralization. The hip joint space is maintained. No  fracture, dislocation, or bony abnormality is seen.      The pelvic ring is intact without fracture or disruption.      IMPRESSION:  Normal examination of the right hip.      SACRUM and COCCYX 24:  FINDINGS:  Normal mineralization. No fracture, malalignment, or bony destruction  is seen. The sacroiliac joints are unremarkable.      IMPRESSION:  Normal examination of the sacrum and coccyx.    LUMBAR SPINE 24:  FINDINGS:  Normal mineralization. There is a very minimal dextroscoliosis of the  lumbar spine. No spondylolisthesis or spondylolysis is observed.  There is mild-to-moderate disc space narrowing at the L5-S1 level  with minimal decrease in height of the L4-5 interspace. No  osteophytosis is seen.      IMPRESSION:  Minimal dextroscoliosis of the lumbar spine with mild disc space  narrowing at L4-5 and mild-to-moderate disc space narrowing at L5-S1..     ==> patient advised that I have referred her to orthopedic surgery (spine) to further discuss and physical therapy         Also seen by me on 10/30/23 as  NEW PATIENT as a transfer of PCP care from Dr. Wilson TO ESTABLISH PCP care with complaints of HEART PALPITATIONS. At visit:  -ORDERED LABS==> NOT YET COMPLETED  -EKG ordered/completed: EK bpm, NSR. No ischemic changes.   -ORDERED CT Heart Ca scoring ==> COMPLETED 23  -ORDERED thyroid US for enlarged thyroid==> COMPLETED  11/9/23  -ORDERED referral to cardiology Dr. Vallabehini ordered==> Saw cardiology  -ORDERED referral to  psychiatry for social stressors==> saw psychaitry  -discussed if has difficulty being seen at  psychiatry or if does nbot accept her insurance, and she prefers to schedule at::  1) [facility] [phone] OR 2) [facility]: [phone]  -mammogram up to date; NEXT DUE 3/2024   -flu vaccine due now==>RECEIVED TODAY  -recommend updated COVID vaccine that can be obtained at local pharmacy  -TDAP==>? NOW DUE Will discuss at follow up visit   -FOLLOW-UP: 4 weeks to discuss and review test results        After visit on 10/30/23 based in test results:     CT Heart Ca score (11/14/23)  IMPRESSION:  1. Coronary artery calcium score of 0*.        Thyroid US 11/9/23:  Slightly increased vascularity of the thyroid gland bilaterally;  otherwise unremarkable exam.     Will possibly repeat thyroid US in 6-12 months to monitor; NEXT DUE 5/2024-11/2024        Last seen by Dr. Wilson 2/2/23 for a PHYSICAL and PAP SMEAR. At visit:  -ordered labs (CBC, CMP, lipid panel, Ucx)  -ordered mammogram==>  -pap completed and sent  -for UTI, PRESCRIBED keflex     After visit 2/2/23 based on test results:  -CBC grossly normal other elevated monocyte %  -lipid panel WNL  -Ucx negative for UTI  -pap cytology: negative for intraepithelial lesion or malignancy.  Cellular evidence parakeratosis usually self-limiting, negative for HPV, continue routine follow-up     Mammogram 3/14/23: Stable mammograms without features to indicate malignancy. Screening  mammography in 1 year is recommended.     ACR BI-RADS 2:  Benign finding.        PMHx:  -dyspareunia==> referred to OBGYN Dr. Staley 2/2/23  -UTI  -history HR HPV     Healthcare Providers:  -cardiology==> previously referred to Dr. Vallabehini for intermittent heart palpitations  -GYN: ?  -prior PCP: Dr. Wilson        Preventive Health Services:  -Last physical: 2/2/23;  "NEXT DUE 2/2024  -last pap: ?  -last mammogram: 3/14/23: Stable mammograms without features to indicate malignancy. Screening;mammography in 1 year is recommended. ACR BI-RADS 2:  Benign finding.  NEXT DUE 3/2024  -last colonoscopy: NEVER; due at age 44 years old==> DUE 9/2024  -last STI screening: ?  -last Hep C screening: ?     Immunizations:  - Childhood vaccines: completed per patient  -COMPLETED COVID primary vaccine series and booster  -flu vaccine: completed 10/2023  -TDAP: now due        Immunization History   Administered Date(s) Administered    Flu vaccine (IIV4), preservative free *Check age/dose* 10/15/2022, 10/30/2023    Hepatitis B vaccine, adult (RECOMBIVAX, ENGERIX) 09/18/2008, 10/23/2008, 03/12/2009    Influenza, injectable, quadrivalent 10/23/2020, 11/23/2021    Pfizer COVID-19 vaccine, Fall 2023, 12 years and older, (30mcg/0.3mL) 11/24/2023    Pfizer COVID-19 vaccine, bivalent, age 12 years and older (30 mcg/0.3 mL) 10/15/2022    Pfizer Purple Cap SARS-CoV-2 04/06/2021, 05/04/2021, 11/26/2021             INTERVAL HISTORY      -Saw orthopedic surgery Dr. Sp Mercer MD  on 3/1/24. Per provider note:  43-year-old woman, with discogenic back pain.  The natural history of disc degeneration was discussed at length.  I stressed that the natural history of disc degeneration is usually favorable, with pain and disability decreasing over time when treated with a multi-modal, focused rehabilitation program.  I encouraged, therefore, continued non-operative care, focusing on core strengthening, regular cardiovascular exercise, abstaining from nicotine products, and maintaining a healthy weight.     I recommended prednisone taper, and physical therapy for core strengthening.  I can see her back as needed.\"         -saw GYN Kathy Newby PA-C  on 3/1/24. Per provider note:  -UTD on pap smear, next due 2/2026.  -Сергей appt scheduled for 3/15/24.  -Continue monitoring and tracking menses.   -Recommended " supportive bras, limiting caffeine intake, and can use warm compresses/ibuprofen as needed prior to menses onset if and when cyclical breast tenderness returns. Otherwise, plan to update [facility] at this time.         Today Estefania reports:    -RESOLVING intermittent RIGHT hip and lower back discomfort x 2 months that occurred after lifting a heavy object, rated in severity as 3/10. She  reports that physical therapy has been helpful, and she felt that it was empowering and helpful to have orthopedic surgery input and recommendations.      -acute onset Left sided neck and LEFT shoulder pain x 4 days, initially rated in severity 7/10, slightly improving over time since taking flexeril and doing yoga and neck stretches, now rated in severity as 4/10,  but has not fully resolved. She reports that her left neck and shoulder have the sensation of a muscle pull or spasm. She denies hearing or feeling a pop prior to onset  of left sided neck and shoulder pain.  Left neck and shoulder pain exacerbated by activity/movement, somewhat alleviated by rest, flexeril, and heating pad.  She denies fevers/chills, limp, saddle anesthesia, urinary or fecal incontinence or retention, numbness/tingling, weakness, or difficulty ambulating.      -otherwise doing and feeling well     - mood is good today.         Today she reports no other complaints, issues, or problems.     ROS is NEG for HEADACHE, NAUSEA, VOMITING, DIARRHEA, CHEST PAIN, SOB, and BLEEDING.   Review of systems (10+) is negative for all systems except for any identified issues in HPI above.        Contraception: use condoms 100% of the time;   Sexually active with  x 13 years.   Denies history STIs.         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[employer]; [facility] [employer] Disabilities  -tobacco use: CURRENT SMOKER   -alcohol use: socially; 2-3 drinks (beer or wine) once time weekly  -illicits: DENIES      FHx:  Cancer:  -breast cancer: mother, passed in her 50s of metastatic breast cancer   HTN: father  DM: paternal aunts  Heart Disease: father (CAD) s/p stents; mother; maternal  uncles x 3 heart attacks (60s)  Stroke: DENIES  Thyroid Disease: DENIES         Objective     LMP 02/09/2024      Physical Examination:       GENERAL           General Appearance: well-appearing, well-developed, well-hydrated, well-nourished, no acute distress.        HEENT           NECK supple, DIFFUSELY ENLARGED THYROID, THYROID NON-TENDER TO PALPABLE, NO THYROID PALPABLE MASSES OR NODULES  no masses or thyromegaly, no carotid bruit.        EYES           Extraocular Movements: normal, bilateral eyes PUNEET, no conjunctival injection.        HEART           Rate and Rhythm regular rate and rhythm. Heart sounds: normal S1S2, no S3 or S4. Murmurs: none.        CHEST           Breath sounds: Clear to IPPA, RR<16 no use of accessory muscles.        ABDOMEN           General: Neg for LKKS or masses, no scleral icterus or jaundice.        MUSCULOSKELETAL           Joints Demonstration: Neg for erythema, swelling or joint deformities. gross abnormalities no gross abnormalities. SPINE (cervical--> coccyx): non-tender to palpation. Full ROM. Paraspinal muscles: LEFT cervical paraspinal muscle tender to palpation, with palpation muscle spasm. No other paraspinal muscles tenderness. LEFT trapzezius muscle tender to palpation. LEFT SHOULDER: non-tender to palpation, no effusions, Full ROM.        EXTREMITIES           Lower Extremities: Neg for cyanosis, clubbing or edema.       Assessment/Plan   Problem List Items Addressed This Visit    None    LEFT Sided neck pain and LEFT shoulder pain x 4 days, improving over time with flexeril, heating pad, and stretching, most consistent with muscle spasms. No known history of neck or shoulder trauma. No red flag signs/sxs.  -cervical spine Xray ordered  -left shoulder Xray ordered  -PT referral  ordered  -flexeril 5 mg as needed with food every 8 hours for muscle spsams  - patient advised to apply heating pad to lumbar back muscles and OTC NSAIDs q 8h prn or tylenol for discomfort. Patient counseled on limiting NSAID use since long term NSAID use can cause irreversible renal damage  -Emergency Dept precautions discussed and reviewed with patient      RESOLVING Intermittent Right hip and lower back pain x 2 months after lifting heaving object. Most consistent with muscular spasms. History of right hip trauma (). MSK  exam WNL, other than tenderness to palpation of RIGHT lumbar paraspinal muscles. Neuro exam WNL. No red flag Sxs. Suspect muscles spasms and strain.  XR Right HIP/PELVIS 24: Normal examination of the right hip. sacrum/coccyx XR 24: Normal examination of the sacrum and coccyx. XR Lumbar spine 24: Minimal dextroscoliosis of the lumbar spine with mild disc space narrowing at L4-5 and mild-to-moderate disc space narrowing at L5-S1..  -referral to PT previously ordered 24  -previously referred to orthopedic surgery for Minimal dextroscoliosis of the lumbar spine with mild disc space narrowing at L4-5 and mild-to-moderate disc space narrowing at L5-S1.  -cont management per orthopedic surgery   - patient advised to apply heating pad to lumbar back muscles and OTC NSAIDs q 8h prn or tylenol for discomfort. Patient counseled on limiting NSAID use since long term NSAID use can cause irreversible renal damage  -Emergency Dept precautions discussed and reviewed with patient    Abnormal urinalysis (3-5 RBC; 24): per patient at the time that UA was provided she was on menses. R/o miscroscopic hematuria  -previously ordered repeat UA==> NOT YET COMPLETED==> patient advised to please go to [facility] lab to complete     History of Heart Palpitations: Coronary Ca score= 0 (CT Heart Ca scoring 23). EKG 10/30/23 EK bpm, NSR. No ischemic changes. Followed by cardiology and  completed [employer]/portable cardiac moinitor that per patient showed no arrhythmias and was normal (no records available for review in EMR)  -cont management per cardiology  -encouraged to limit caffeine intake  -Emergency Dept and 911/EMS precautions discussed and reviewed     Social stressors: improving; saw psychiatry at [facility] and will call to schedule follow-up visit  -previously referral to  psychiatry 10/30/23  -discussed if has difficulty being seen at  psychiatry or if does not accept her insurance, and she prefers to schedule at::  1) [facility] [phone]  OR  2) [facility]: [phone]  -will continue to monitor     Vitamin D deficiency  -cont OTC Vit D3 2,000 units daily    Enlarged thyroid: Thyroid US 11/9/23 showed Slightly increased vascularity of the thyroid gland bilaterally;  otherwise unremarkable exam.   -Will plan repeat thyroid US in 6-12 months to monitor; NEXT DUE 5/2024-11/2024     STI Screening:  -HIV, syphilis, GC/CT, trich previously ordered==> NOT YET COMPLETED==> patient advised to go to [facility] to complete     Breast Cancer Screening: MAMMOGRAM NOW DUE==> scheduled for 3/15/24   -mammogram ordered 2/9/24==>  scheduled for 3/15/24    Cervical Cancer Screening; next pap due 2/2026  -cont well woman care and pap smears per GYN     Colon Cancer Screening: DUE at age 44 (9/2024)     Counseling:       Medication education:         Education:  The patient is counseled regarding potential side-effects of all new medications        Understanding:  Patient expressed understanding        Adherence:  No barriers to adherence identified        Immunizations Counseling  -flu vaccine received 10/30/23  -TDAP now due==> declined today, prefers to receive at follow up visit  -recommend updated COVID spike vaccine that can be obtained at local pharmacy     FOLLOW-UP: 4-8 weeks to discuss and review test results     Discussed recommended plan of care with patient. Patient  expressed understanding and agreement with plan of care. All of patient's questions were answered at the time. Patient had no additional questions at the time.            Bebe Lemus MD, PhD

## 2024-03-07 NOTE — PATIENT INSTRUCTIONS
It was nice seeing you today.    I have ordered xray of your neck and left shoulder; you can walk into [facility] to have completed or can complete at [place] radiology down the hallway from our clinic     Please take flexeril 5 mg as need for neck muscle spasms, I recommend to take at night since they can make you feel sleepy and apply heating pad to neck and left shoulder     Please go to [facility] lab or another [facility] lab facility to complete the  repeat urine lab testing that I ordered       Please complete your  mammogram that is scheduled 3/15/24     Please call referral line to schedule: 1)  psychiatry appointment unless you prefer to follow with [facility] or another community based behavioral health provider; 2) physical therapy for neck and shoulder pain     I recommend receiving the TDAP booster that prevents tetanus and other infectious disease     I recommend the updated COVID vaccine that can be obtained at your local pharmacy     Please schedule a follow up with me in 8 weeks for a physical

## 2024-03-08 ENCOUNTER — HOSPITAL ENCOUNTER (OUTPATIENT)
Dept: RADIOLOGY | Facility: CLINIC | Age: 44
Discharge: HOME | End: 2024-03-08
Payer: COMMERCIAL

## 2024-03-08 ENCOUNTER — OFFICE VISIT (OUTPATIENT)
Dept: PRIMARY CARE | Facility: CLINIC | Age: 44
End: 2024-03-08
Payer: COMMERCIAL

## 2024-03-08 VITALS
DIASTOLIC BLOOD PRESSURE: 80 MMHG | TEMPERATURE: 99.2 F | OXYGEN SATURATION: 100 % | SYSTOLIC BLOOD PRESSURE: 128 MMHG | BODY MASS INDEX: 25.99 KG/M2 | HEART RATE: 57 BPM | WEIGHT: 176 LBS

## 2024-03-08 DIAGNOSIS — M54.2 NECK PAIN: ICD-10-CM

## 2024-03-08 DIAGNOSIS — E04.9 ENLARGED THYROID: ICD-10-CM

## 2024-03-08 DIAGNOSIS — M54.50 CHRONIC RIGHT-SIDED LOW BACK PAIN WITHOUT SCIATICA: Primary | ICD-10-CM

## 2024-03-08 DIAGNOSIS — G89.29 CHRONIC RIGHT-SIDED LOW BACK PAIN WITHOUT SCIATICA: Primary | ICD-10-CM

## 2024-03-08 DIAGNOSIS — M62.838 MUSCLE SPASM: ICD-10-CM

## 2024-03-08 DIAGNOSIS — M25.551 RIGHT HIP PAIN: ICD-10-CM

## 2024-03-08 DIAGNOSIS — Z12.31 ENCOUNTER FOR SCREENING MAMMOGRAM FOR MALIGNANT NEOPLASM OF BREAST: ICD-10-CM

## 2024-03-08 DIAGNOSIS — M25.512 ACUTE PAIN OF LEFT SHOULDER: ICD-10-CM

## 2024-03-08 DIAGNOSIS — R00.2 HEART PALPITATIONS: ICD-10-CM

## 2024-03-08 DIAGNOSIS — Z65.9 OTHER SOCIAL STRESSOR: ICD-10-CM

## 2024-03-08 DIAGNOSIS — M54.50 LOW BACK PAIN, UNSPECIFIED BACK PAIN LATERALITY, UNSPECIFIED CHRONICITY, UNSPECIFIED WHETHER SCIATICA PRESENT: ICD-10-CM

## 2024-03-08 DIAGNOSIS — E55.9 VITAMIN D DEFICIENCY: ICD-10-CM

## 2024-03-08 DIAGNOSIS — Z71.85 IMMUNIZATION COUNSELING: ICD-10-CM

## 2024-03-08 PROCEDURE — 4004F PT TOBACCO SCREEN RCVD TLK: CPT | Performed by: FAMILY MEDICINE

## 2024-03-08 PROCEDURE — 72050 X-RAY EXAM NECK SPINE 4/5VWS: CPT | Performed by: RADIOLOGY

## 2024-03-08 PROCEDURE — 73030 X-RAY EXAM OF SHOULDER: CPT | Mod: LT

## 2024-03-08 PROCEDURE — 72050 X-RAY EXAM NECK SPINE 4/5VWS: CPT

## 2024-03-08 PROCEDURE — 72110 X-RAY EXAM L-2 SPINE 4/>VWS: CPT | Performed by: RADIOLOGY

## 2024-03-08 PROCEDURE — 72110 X-RAY EXAM L-2 SPINE 4/>VWS: CPT

## 2024-03-08 PROCEDURE — 73030 X-RAY EXAM OF SHOULDER: CPT | Mod: LEFT SIDE | Performed by: RADIOLOGY

## 2024-03-08 PROCEDURE — 99214 OFFICE O/P EST MOD 30 MIN: CPT | Performed by: FAMILY MEDICINE

## 2024-03-08 RX ORDER — GLUCOSAM/CHONDRO/HERB 149/HYAL 750-100 MG
1 TABLET ORAL DAILY
COMMUNITY

## 2024-03-08 ASSESSMENT — PATIENT HEALTH QUESTIONNAIRE - PHQ9
SUM OF ALL RESPONSES TO PHQ9 QUESTIONS 1 AND 2: 0
2. FEELING DOWN, DEPRESSED OR HOPELESS: NOT AT ALL
1. LITTLE INTEREST OR PLEASURE IN DOING THINGS: NOT AT ALL

## 2024-03-08 ASSESSMENT — PAIN SCALES - GENERAL: PAINLEVEL: 3

## 2024-03-15 ENCOUNTER — LAB (OUTPATIENT)
Dept: LAB | Facility: LAB | Age: 44
End: 2024-03-15
Payer: COMMERCIAL

## 2024-03-15 ENCOUNTER — HOSPITAL ENCOUNTER (OUTPATIENT)
Dept: RADIOLOGY | Facility: HOSPITAL | Age: 44
Discharge: HOME | End: 2024-03-15
Payer: COMMERCIAL

## 2024-03-15 ENCOUNTER — APPOINTMENT (OUTPATIENT)
Dept: PHYSICAL THERAPY | Facility: CLINIC | Age: 44
End: 2024-03-15
Payer: COMMERCIAL

## 2024-03-15 VITALS — BODY MASS INDEX: 25.18 KG/M2 | HEIGHT: 69 IN | WEIGHT: 170 LBS

## 2024-03-15 DIAGNOSIS — R82.90 ABNORMAL URINALYSIS: ICD-10-CM

## 2024-03-15 DIAGNOSIS — Z12.31 ENCOUNTER FOR SCREENING MAMMOGRAM FOR MALIGNANT NEOPLASM OF BREAST: ICD-10-CM

## 2024-03-15 DIAGNOSIS — Z11.3 ROUTINE SCREENING FOR STI (SEXUALLY TRANSMITTED INFECTION): ICD-10-CM

## 2024-03-15 LAB
APPEARANCE UR: CLEAR
BILIRUB UR STRIP.AUTO-MCNC: NEGATIVE MG/DL
COLOR UR: ABNORMAL
GLUCOSE UR STRIP.AUTO-MCNC: NORMAL MG/DL
KETONES UR STRIP.AUTO-MCNC: NEGATIVE MG/DL
LEUKOCYTE ESTERASE UR QL STRIP.AUTO: NEGATIVE
NITRITE UR QL STRIP.AUTO: NEGATIVE
PH UR STRIP.AUTO: 6.5 [PH]
PROT UR STRIP.AUTO-MCNC: NEGATIVE MG/DL
RBC # UR STRIP.AUTO: NEGATIVE /UL
SP GR UR STRIP.AUTO: 1
UROBILINOGEN UR STRIP.AUTO-MCNC: NORMAL MG/DL

## 2024-03-15 PROCEDURE — 87661 TRICHOMONAS VAGINALIS AMPLIF: CPT

## 2024-03-15 PROCEDURE — 77067 SCR MAMMO BI INCL CAD: CPT | Performed by: RADIOLOGY

## 2024-03-15 PROCEDURE — 77063 BREAST TOMOSYNTHESIS BI: CPT | Performed by: RADIOLOGY

## 2024-03-15 PROCEDURE — 77067 SCR MAMMO BI INCL CAD: CPT

## 2024-03-15 PROCEDURE — 87800 DETECT AGNT MULT DNA DIREC: CPT

## 2024-03-15 PROCEDURE — 81003 URINALYSIS AUTO W/O SCOPE: CPT

## 2024-03-16 LAB
C TRACH RRNA SPEC QL NAA+PROBE: NEGATIVE
N GONORRHOEA DNA SPEC QL PROBE+SIG AMP: NEGATIVE
T VAGINALIS RRNA SPEC QL NAA+PROBE: NEGATIVE

## 2024-04-01 ENCOUNTER — EVALUATION (OUTPATIENT)
Dept: PHYSICAL THERAPY | Facility: CLINIC | Age: 44
End: 2024-04-01
Payer: COMMERCIAL

## 2024-04-01 DIAGNOSIS — M54.50 CHRONIC RIGHT-SIDED LOW BACK PAIN WITHOUT SCIATICA: ICD-10-CM

## 2024-04-01 DIAGNOSIS — G89.29 CHRONIC RIGHT-SIDED LOW BACK PAIN WITHOUT SCIATICA: ICD-10-CM

## 2024-04-01 PROCEDURE — 97161 PT EVAL LOW COMPLEX 20 MIN: CPT | Mod: GP

## 2024-04-01 NOTE — PROGRESS NOTES
Physical Therapy    Physical Therapy Evaluation    Patient Name: Estefania Hobbs  MRN: [id number]  Today's Date: 4/3/2024  Time Calculation  Start Time: 1145  Stop Time: 1225  Time Calculation (min): 40 min    MMO - NO AUTH / [place] VISITS / 90% COVERAGE  / OOP $1950 - $568 met / 3/29/24     Visit 1/4    Assessment  PT Assessment Results: Decreased strength, Decreased range of motion, Decreased endurance, Impaired balance, Decreased mobility, Impaired hearing, Pain  Rehab Prognosis: Ian is a 43 year old F who participated in a physical therapy evaluation today due to Neck pain/shoulder pain. His/her impairments include; [ balance deficits, sensation loss, tenderness, strength, pain, swelling, ROM deficits, motor control]. Due to these impairments, pt has the following functional limitations [ Gait deviations, increased fall risk, difficulty with sleeping, stair negotiation, transfers, performing household/recreational/ occupational activities, and performing ADLs]. Pt will benefit from continued skilled physical therapy to address above impairments and progress toward therapy related goals.       Plan  Treatment/Interventions: Education/ Instruction, Manual therapy, Mechanical traction, Neuromuscular re-education, Therapeutic activities, Therapeutic exercises, Dry needling  PT Plan: Skilled PT  PT Frequency: 1 time per week  Duration: 6 weeks  Onset Date: 04/01/23  Number of Treatments Authorized: MN  Rehab Potential: Good  Plan of Care Agreement: Patient    Plan next visit  Manual + cervical strengthening   Hip IR/ER strengthening   Hip ext strengthening + core strengthening  Current Problem  1. Chronic right-sided low back pain without sciatica  Referral to Physical Therapy    Follow Up In Physical Therapy          Subjective   RFV: L sided neck pain restricting L head rotation, and L side bending. R sided low back + hip pain. Denies radicular symptoms in UE/LE. Onset of pain for both neck, and low back are both  insidious in nature. Pt lives an active lifestyle, participates in yoga regularly. Plan to follow up with Dr. Mercer in the next 2 weeks.     Impression;   IMPRESSION:  Normal radiographs of the lumbar spine    IMPRESSION:  Normal radiographs of the left shoulder    IMPRESSION:  Normal radiographs of the cervical spine    Objective         Cervical AROM  Cervical flexion: (80°): WFL  Cervical extension: (50°): WFL  Cervical rotation right: (80°): 58  Cervical rotation left: (80°): 50  Cervical sidebend right: (45°): 28  Cervical sidebend left: (45°): 34  Shoulder AROM  Shoulder AROM WFL: yes  Cervical Strength     Myotomes (MMT)  R Scapular Elevation (C4 ): (5/5): 5  L Scapular Elevation (C4 ): (5/5): 5  R Shoulder Abduction (C5 ): (5/5): 5  L Shoulder Abduction (C5 ): (5/5): 4  R Elbow Flexion (C6 ): (5/5): 5  L Elbow Flexion (C6 ): (5/5): 5  R Wrist Extension (C6 ): (5/5): 5  L Wrist Extension (C6 ): (5/5): 5  R Elbow Extension (C7 ): (5/5): 5  L Elbow Extension (C7 ): (5/5): 5  R Wrist Flexion (C7 ): (5/5): 5  L Wrist Flexion (C7 ): (5/5): 5  Scapular (MMT)  R lower trapezius: (5/5): 4+  L lower trapezius: (5/5): 4  R middle trapezius: (5/5): 4  L middle trapezius: (5/5): 4-  DTR     Dermatomes  Dermatomes WFL: yes  Special Tests  Special Tests Negative: yes       and Lumbar Spine    Functional Rating Scale     Observation     Lumbar Palpation/Joint Mobility Assessment     Lumbar AROM  Lumbar AROM WFL: yes  Hip AROM  Hip AROM WFL: yes  Lumbar Myotomes  Lumbar Myotomes WFL: yes  Specific Lower Extremity MMT  R Iliopsoas: (5/5): 5  L Iliopsoas: (5/5): 4+  R Gluteals (prone): (5/5): 4  L Gluteals (prone): (5/5): 4  R Hip External Rotation: (5/5): 4-  L Hip External Rotation: (5/5): 4  DTR     Dermatomes  Dermatomes WFL: yes  Special Tests  Special Tests Negative: yes         OP EDUCATION:  Outpatient Education  Individual(s) Educated: Patient  Risk and Benefits Discussed with Patient/Caregiver/Other:  yes  Patient/Caregiver Demonstrated Understanding: yes  Plan of Care Discussed and Agreed Upon: yes  Patient Response to Education: Patient/Caregiver Verbalized Understanding of Information, Patient/Caregiver Performed Return Demonstration of Exercises/Activities, Patient/Caregiver Asked Appropriate Questions    Goals:  Active       PT Problem       Pt will demonstrate 4+/5 B/L UE strength        Start:  04/01/24    Expected End:  06/30/24            Pt will demonstrate symmetrical AROM in B/L UEs        Start:  04/01/24    Expected End:  06/30/24            pt will report no greater than 2/10 pain for 3 consecutive days         Start:  04/01/24    Expected End:  06/30/24            Pt will be indep with HEP         Start:  04/01/24    Expected End:  06/30/24

## 2024-04-03 PROBLEM — M54.2 NECK PAIN: Status: ACTIVE | Noted: 2024-04-03

## 2024-04-03 PROBLEM — M54.50 LOW BACK PAIN: Status: ACTIVE | Noted: 2024-04-03

## 2024-04-07 NOTE — PROGRESS NOTES
Subjective   Estefania Hobbs is a 43 y.o. female who presents for FOLLOW UP VISIT TO DISCUSS and REVIEW TEST RESULTS.    HPI:      42 yo female CURRENT SMOKER (1/2 ppd x 10 years= 5 pack-years)  presents for FOLLOW-Up VISIT to DISCUSS and REVIEW TEST RESULTS.      [facility] records reviewed.     Last seen by me on 3/15/24 FOLLOW-Up VISIT to DISCUSS and REVIEW TEST RESULTS.. At visit:    LEFT Sided neck pain and LEFT shoulder pain x 4 days, improving over time with flexeril, heating pad, and stretching, most consistent with muscle spasms. No known history of neck or shoulder trauma. No red flag signs/sxs.  -cervical spine Xray ordered  -left shoulder Xray ordered  -PT referral ordered  -flexeril 5 mg as needed with food every 8 hours for muscle spsams  - patient advised to apply heating pad to lumbar back muscles and OTC NSAIDs q 8h prn or tylenol for discomfort. Patient counseled on limiting NSAID use since long term NSAID use can cause irreversible renal damage  -Emergency Dept precautions discussed and reviewed with patient        RESOLVING Intermittent Right hip and lower back pain x 2 months after lifting heaving object. Most consistent with muscular spasms. History of right hip trauma (2105). MSK  exam WNL, other than tenderness to palpation of RIGHT lumbar paraspinal muscles. Neuro exam WNL. No red flag Sxs. Suspect muscles spasms and strain.  XR Right HIP/PELVIS 2/12/24: Normal examination of the right hip. sacrum/coccyx XR 2/12/24: Normal examination of the sacrum and coccyx. XR Lumbar spine 2/12/24: Minimal dextroscoliosis of the lumbar spine with mild disc space narrowing at L4-5 and mild-to-moderate disc space narrowing at L5-S1..  -referral to PT previously ordered 2/9/24  -previously referred to orthopedic surgery for Minimal dextroscoliosis of the lumbar spine with mild disc space narrowing at L4-5 and mild-to-moderate disc space narrowing at L5-S1.  -cont management per orthopedic surgery   - patient  advised to apply heating pad to lumbar back muscles and OTC NSAIDs q 8h prn or tylenol for discomfort. Patient counseled on limiting NSAID use since long term NSAID use can cause irreversible renal damage  -Emergency Dept precautions discussed and reviewed with patient     Abnormal urinalysis (3-5 RBC; 24): per patient at the time that UA was provided she was on menses. R/o miscroscopic hematuria  -previously ordered repeat UA==> NOT YET COMPLETED==> patient advised to please go to [facility] lab to complete     History of Heart Palpitations: Coronary Ca score= 0 (CT Heart Ca scoring 23). EKG 10/30/23 EK bpm, NSR. No ischemic changes. Followed by cardiology and completed Zio/portable cardiac moinitor that per patient showed no arrhythmias and was normal (no records available for review in EMR)  -cont management per cardiology  -encouraged to limit caffeine intake  -Emergency Dept and 911/EMS precautions discussed and reviewed     Social stressors: improving; saw psychiatry at [facility] and will call to schedule follow-up visit  -previously referral to  psychiatry 10/30/23  -discussed if has difficulty being seen at  psychiatry or if does not accept her insurance, and she prefers to schedule at::  1) [facility] [phone]  OR  2) [facility]: [phone]  -will continue to monitor     Vitamin D deficiency  -cont OTC Vit D3 2,000 units daily     Enlarged thyroid: Thyroid US 23 showed Slightly increased vascularity of the thyroid gland bilaterally;  otherwise unremarkable exam.   -Will plan repeat thyroid US in 6-12 months to monitor; NEXT DUE 2024-2024     STI Screening:  -HIV, syphilis, GC/CT, trich previously ordered==> NOT YET COMPLETED==> patient advised to go to [facility] to complete     Breast Cancer Screening: MAMMOGRAM NOW DUE==> scheduled for 3/15/24   -mammogram ordered 24==>  scheduled for 3/15/24     Cervical Cancer Screening; next pap due  2/2026  -cont well woman care and pap smears per GYN     Colon Cancer Screening: DUE at age 44 (9/2024)     Immunizations Counseling  -flu vaccine received 10/30/23  -TDAP now due==> declined today, prefers to receive at follow up visit  -recommend updated COVID spike vaccine that can be obtained at local pharmacy     FOLLOW-UP: 4-8 weeks to discuss and review test results      After visit on 3/8/24 based on test results:  -grossly normal urinalysis, with no blood.     -GC/CT/trich negative    Mammogram (3/15/24):  FINDINGS:  2D and tomosynthesis images were reviewed at 1 mm slice thickness.      Density:  The breast tissue is heterogeneously dense, which may  obscure small masses.      A stable asymmetry seen in the superolateral left breast at posterior  depth. No suspicious masses or calcifications are identified.      IMPRESSION:  No mammographic evidence of malignancy.    Left Shoulder XR (3/8/24):  FINDINGS:  Left shoulder, three views      There is no fracture pr there is no dislocation. There are no  degenerative changes.      IMPRESSION:  Normal radiographs of the left shoulder    Cervical spine (3/8/24):  FINDINGS:  C-spine, five views      There is no fracture. There is no spondylolisthesis. No degenerative  change seen. The neural foramina are patent      IMPRESSION:  Normal radiographs of the cervical spine    Lumbar spine (3/8/24):  FINDINGS:  Lumbar spine, five views      There is no fracture. There is no spondylolisthesis. There are no  degenerative changes      IMPRESSION:  Normal radiographs of the lumbar spine        Also seen by me on 2/9/24 for a  PHYSICAL. At visit:  -ORDERED LABS==>COMPLETED; did not COMPLETE URINE STI testing  -ORDERED mammogram (due 3/3024)==>NOT YET COMPLETED  -Intermittent Right hip and lower back pain x 2 months after lifting heaving object. Most consistent with muscular spasms. History of right hip trauma (2105). MSK  exam WNL, other than tenderness to palpation of RIGHT  lumbar paraspinal muscles. Neuro exam WNL. No red flag Sxs. Suspect muscles spasms and strain.  R/o hip and lumbar spine structural abnormalities  -lumbar spine and sacrum/coccyx XR ordered==> COMPLETED  -Righ hip XR ordered==> COMPLETED  -referral to PT ordered  -flexeril 5 mg prn at night before bedtime  - patient advised to apply heating pad to lumbar back muscles and OTC NSAIDs q 8h prn or tylenol for discomfort. Patient counseled on limiting NSAID use since long term NSAID use can cause irreversible renal damage  -Social stressors: improving; saw psychiatry at [facility] and will call to schedule follow-up visit: previously referral to  psychiatry 10/30/23; discussed if has difficulty being seen at  psychiatry or if does not accept her insurance, and she prefers to schedule at:: 1) [facility] [phone] OR 2) [facility]: [phone]  -ORDERED REFERRAL to GYN to establish well woman care and for pap smear  -flu vaccine received 10/30/23  -TDAP now due==>   -recommend updated COVID spike vaccine that can be obtained at local pharmacy   -FOLLOW-UP: 4 weeks to discuss and review test results        After visit on 2/9/24 based on test results:  -abnormal blood in urine (3-5 red blood cells)==> patient was on menses at time of UA==> re-ordered UA==> NOT YET COMPLETED     -low Vit D levels: patient advised to please start over the counter Vitamin D3 2,000 units daily     -HIV, syphilis, and Hep C negative     -normal thyroid function     -normal liver and kidney function, normal glucose and electrolytes     -good cholesterol control. Advised patient that I Recommend starting omega 3 fish oil 1,000 mg daily to improve HDL (good cholesterol) levels     -HgBA1c normal: no prediabetes and no diabetes     -normal blood counts     RIGHT HIP and PELVIS 2/12/24:  FINDINGS:  Normal mineralization. The hip joint space is maintained. No  fracture, dislocation, or bony abnormality is seen.      The  pelvic ring is intact without fracture or disruption.      IMPRESSION:  Normal examination of the right hip.        SACRUM and COCCYX 24:  FINDINGS:  Normal mineralization. No fracture, malalignment, or bony destruction  is seen. The sacroiliac joints are unremarkable.      IMPRESSION:  Normal examination of the sacrum and coccyx.     LUMBAR SPINE 24:  FINDINGS:  Normal mineralization. There is a very minimal dextroscoliosis of the  lumbar spine. No spondylolisthesis or spondylolysis is observed.  There is mild-to-moderate disc space narrowing at the L5-S1 level  with minimal decrease in height of the L4-5 interspace. No  osteophytosis is seen.      IMPRESSION:  Minimal dextroscoliosis of the lumbar spine with mild disc space  narrowing at L4-5 and mild-to-moderate disc space narrowing at L5-S1..      ==> patient advised that I have referred her to orthopedic surgery (spine) to further discuss and physical therapy            Also seen by me on 10/30/23 as  NEW PATIENT as a transfer of PCP care from Dr. Wilson TO ESTABLISH PCP care with complaints of HEART PALPITATIONS. At visit:  -ORDERED LABS==> NOT YET COMPLETED  -EKG ordered/completed: EK bpm, NSR. No ischemic changes.   -ORDERED CT Heart Ca scoring ==> COMPLETED 23  -ORDERED thyroid US for enlarged thyroid==> COMPLETED 23  -ORDERED referral to cardiology Dr. Vallabehini ordered==> Saw cardiology  -ORDERED referral to  psychiatry for social stressors==> saw psychaitry  -discussed if has difficulty being seen at  psychiatry or if does nbot accept her insurance, and she prefers to schedule at::  1) [facility] [phone] OR 2) [facility]: [phone]  -mammogram up to date; NEXT DUE 3/2024   -flu vaccine due now==>RECEIVED TODAY  -recommend updated COVID vaccine that can be obtained at local pharmacy  -TDAP==>? NOW DUE Will discuss at follow up visit   -FOLLOW-UP: 4 weeks to discuss and review test results         After visit on 10/30/23 based in test results:     CT Heart Ca score (11/14/23)  IMPRESSION:  1. Coronary artery calcium score of 0*.        Thyroid US 11/9/23:  Slightly increased vascularity of the thyroid gland bilaterally;  otherwise unremarkable exam.     Will possibly repeat thyroid US in 6-12 months to monitor; NEXT DUE 5/2024-11/2024        Last seen by Dr. Wilson 2/2/23 for a PHYSICAL and PAP SMEAR. At visit:  -ordered labs (CBC, CMP, lipid panel, Ucx)  -ordered mammogram==>  -pap completed and sent  -for UTI, PRESCRIBED keflex     After visit 2/2/23 based on test results:  -CBC grossly normal other elevated monocyte %  -lipid panel WNL  -Ucx negative for UTI  -pap cytology: negative for intraepithelial lesion or malignancy.  Cellular evidence parakeratosis usually self-limiting, negative for HPV, continue routine follow-up     Mammogram 3/14/23: Stable mammograms without features to indicate malignancy. Screening  mammography in 1 year is recommended.     ACR BI-RADS 2:  Benign finding.        PMHx:  -dyspareunia==> referred to OBGYN Dr. Staley 2/2/23  -UTI  -history HR HPV     Healthcare Providers:  -cardiology==> previously referred to Dr. Vallabehini for intermittent heart palpitations  -GYN: JUSTIN Newby  -prior PCP: Dr. Wilson  -orthopedic surgery: Dr. Mercer      -saw GYN Kathy Newby PA-C  on 3/1/24. Per provider note:  -UTD on pap smear, next due 2/2026.  -Сергей appt scheduled for 3/15/24.  -Continue monitoring and tracking menses.   -Recommended supportive bras, limiting caffeine intake, and can use warm compresses/ibuprofen as needed prior to menses onset if and when cyclical breast tenderness returns. Otherwise, plan to update сергей at this time.    -Saw orthopedic surgery Dr. Sp Mercer MD  on 3/1/24. Per provider note:  43-year-old woman, with discogenic back pain.  The natural history of disc degeneration was discussed at length.  I stressed that the natural history of disc  "degeneration is usually favorable, with pain and disability decreasing over time when treated with a multi-modal, focused rehabilitation program.  I encouraged, therefore, continued non-operative care, focusing on core strengthening, regular cardiovascular exercise, abstaining from nicotine products, and maintaining a healthy weight.     I recommended prednisone taper, and physical therapy for core strengthening.  I can see her back as needed.\"        Preventive Health Services:  -Last physical: 2/2/23; NEXT DUE 2/2024  -last pap: UTD on pap smear, next due 2/2026.  -last mammogram: 3/14/23: Stable mammograms without features to indicate malignancy. Screening;mammography in 1 year is recommended. ACR BI-RADS 2:  Benign finding.  NEXT DUE 3/2024  -last colonoscopy: NEVER; due at age 44 years old==> DUE 9/2024  -last STI screening: HIV, syphilis, GC/CT/trich negative 2/12/24  -last Hep C screening: Negative 2/12/24     Immunizations:  - Childhood vaccines: completed per patient  -COMPLETED COVID primary vaccine series and booster  -flu vaccine: completed 10/2023  -TDAP: now due        Immunization History   Administered Date(s) Administered    Flu vaccine (IIV4), preservative free *Check age/dose* 10/15/2022, 10/30/2023    Hepatitis B vaccine, adult (RECOMBIVAX, ENGERIX) 09/18/2008, 10/23/2008, 03/12/2009    Influenza, injectable, quadrivalent 10/23/2020, 11/23/2021    Pfizer COVID-19 vaccine, Fall 2023, 12 years and older, (30mcg/0.3mL) 11/24/2023    Pfizer COVID-19 vaccine, bivalent, age 12 years and older (30 mcg/0.3 mL) 10/15/2022    Pfizer Purple Cap SARS-CoV-2 04/06/2021, 05/04/2021, 11/26/2021          INTERVAL HISTORY     -grossly normal urinalysis, with no blood.     -GC/CT/trich negative    Mammogram (3/15/24):  FINDINGS:  2D and tomosynthesis images were reviewed at 1 mm slice thickness.      Density:  The breast tissue is heterogeneously dense, which may  obscure small masses.      A stable asymmetry seen " in the superolateral left breast at posterior  depth. No suspicious masses or calcifications are identified.      IMPRESSION:  No mammographic evidence of malignancy.    Left Shoulder XR (3/8/24):  FINDINGS:  Left shoulder, three views      There is no fracture pr there is no dislocation. There are no  degenerative changes.      IMPRESSION:  Normal radiographs of the left shoulder    Cervical spine (3/8/24):  FINDINGS:  C-spine, five views      There is no fracture. There is no spondylolisthesis. No degenerative  change seen. The neural foramina are patent      IMPRESSION:  Normal radiographs of the cervical spine    Lumbar spine (3/8/24):  FINDINGS:  Lumbar spine, five views      There is no fracture. There is no spondylolisthesis. There are no  degenerative changes      IMPRESSION:  Normal radiographs of the lumbar spine    Today Estefania reports:     -RESOLVED intermittent RIGHT hip and lower back discomfort that occurred after lifting a heavy object. She  reports that physical therapy has been helpful, and she felt that it was empowering and helpful to have orthopedic surgery input and recommendations.       -IMPROVING Left sided neck and LEFT shoulder pain initially rated in severity 7/10, slightly improving over time since taking flexeril and doing yoga and neck stretches, now rated in severity as 1.5/10,  but has not fully resolved. She reports that her left neck and shoulder have the sensation of a muscle pull or spasm. She denies hearing or feeling a pop prior to onset  of left sided neck and shoulder pain.  Left neck and shoulder pain exacerbated by activity/movement, somewhat alleviated by rest, flexeril, and heating pad.  She denies fevers/chills, limp, saddle anesthesia, urinary or fecal incontinence or retention, numbness/tingling, weakness, or difficulty ambulating.      -otherwise doing and feeling well     - mood is good today.         Today she reports no other complaints, issues, or problems.     ROS  is NEG for HEADACHE, NAUSEA, VOMITING, DIARRHEA, CHEST PAIN, SOB, and BLEEDING.   Review of systems (10+) is negative for all systems except for any identified issues in HPI above.        Contraception: use condoms 100% of the time;   Sexually active with  x 13 years.   Denies history STIs.         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[employer]; [facility] [email or web address] Disabilities  -tobacco use: CURRENT SMOKER   -alcohol use: socially; 2-3 drinks (beer or wine) once time weekly  -illicits: DENIES     FHx:  Cancer:  -breast cancer: mother, passed in her 50s of metastatic breast cancer   HTN: father  DM: paternal aunts  Heart Disease: father (CAD) s/p stents; mother; maternal  uncles x 3 heart attacks (60s)  Stroke: DENIES  Thyroid Disease: DENIES        Objective     /86   Pulse 93   Temp 35.8 °C (96.5 °F)   Resp 20   Wt 80.6 kg (177 lb 12.8 oz)   SpO2 98%   BMI 26.26 kg/m²      Physical Examination:       GENERAL           General Appearance: well-appearing, well-developed, well-hydrated, well-nourished, no acute distress.        HEENT           NECK supple,  DIFFUSELY ENLARGED THYROID, THYROID NON-TENDER TO PALPABLE, NO THYROID PALPABLE MASSES OR NODULES  no masses or thyromegaly, no carotid bruit.          EYES           Extraocular Movements: normal, bilateral eyes PUNEET, no conjunctival injection.        HEART           Rate and Rhythm regular rate and rhythm. Heart sounds: normal S1S2, no S3 or S4. Murmurs: none.        CHEST           Breath sounds: Clear to IPPA, RR<16 no use of accessory muscles.        ABDOMEN           General: Neg for LKKS or masses, no scleral icterus or jaundice.        MUSCULOSKELETAL           Joints Demonstration: Neg for erythema, swelling or joint deformities. gross abnormalities no gross abnormalities. SPINE (cervical--> coccyx): non-tender to palpation. Full ROM. Paraspinal muscles: LEFT cervical  paraspinal muscle non tender to palpation. No other paraspinal muscles tenderness. LEFT trapzezius muscle non-tender to palpation. LEFT SHOULDER: non-tender to palpation, no effusions, Full ROM.        EXTREMITIES           Lower Extremities: Neg for cyanosis, clubbing or edema.       Assessment/Plan   Problem List Items Addressed This Visit       Low back pain    Neck pain - Primary     Other Visit Diagnoses       Other social stressor        Vitamin D deficiency        Acute pain of left shoulder        Immunization counseling        Heart palpitations        Enlarged thyroid        Muscle spasm        Right hip pain                History LEFT Sided neck pain and LEFT shoulder pain, improving over time with flexeril, heating pad, and stretching, most consistent with muscle spasms. No known history of neck or shoulder trauma. No red flag signs/sxs. Cervical and left shoulder Xray 3/8/24 per radiology normal   -PT referral ordered 3/8/24  -cont flexeril 5 mg as needed with food every 8 hours for muscle spsams  - patient advised to apply heating pad to lumbar back muscles and OTC NSAIDs q 8h prn or tylenol for discomfort. Patient counseled on limiting NSAID use since long term NSAID use can cause irreversible renal damage  -Emergency Dept precautions discussed and reviewed with patient        RESOLVED Intermittent Right hip and lower back pain after lifting heaving object. Most consistent with muscular spasms. History of right hip trauma (2105). MSK  exam WNL, other than tenderness to palpation of RIGHT lumbar paraspinal muscles. Neuro exam WNL. No red flag Sxs. Suspect muscles spasms and strain.  XR Right HIP/PELVIS 2/12/24: Normal examination of the right hip. sacrum/coccyx XR 2/12/24: Normal examination of the sacrum and coccyx. XR Lumbar spine 2/12/24: Minimal dextroscoliosis of the lumbar spine with mild disc space narrowing at L4-5 and mild-to-moderate disc space narrowing at L5-S1.. XR Lumbar spine 3/8/24:  Normal radiographs of the lumbar spine   -referral to PT previously ordered 24  -previously referred to orthopedic surgery for Minimal dextroscoliosis of the lumbar spine with mild disc space narrowing at L4-5 and mild-to-moderate disc space narrowing at L5-S1.  -cont management per orthopedic surgery   - patient advised to apply heating pad to lumbar back muscles and OTC NSAIDs q 8h prn or tylenol for discomfort. Patient counseled on limiting NSAID use since long term NSAID use can cause irreversible renal damage  -Emergency Dept precautions discussed and reviewed with patient     History Abnormal urinalysis (3-5 RBC; 24): per patient at the time that UA was provided she was on menses==> REPEAT UA 3/15/24 WNL with no abnormal blood  -will continue to monitor     History of Heart Palpitations: Coronary Ca score= 0 (CT Heart Ca scoring 23). EKG 10/30/23 EK bpm, NSR. No ischemic changes. Followed by cardiology and completed Zio/portable cardiac moinitor that per patient showed no arrhythmias and was normal (no records available for review in EMR)  -cont management per cardiology  -encouraged to limit caffeine intake  -Emergency Dept and 911/EMS precautions discussed and reviewed     Social stressors: improving; saw psychiatry at [facility] and will call to schedule follow-up visit  -previously referral to  psychiatry 10/30/23  -discussed if has difficulty being seen at  psychiatry or if does not accept her insurance, and she prefers to schedule at::  1) [facility] [phone]  OR  2) [facility]: [phone]  -will continue to monitor     Vitamin D deficiency  -cont OTC Vit D3 2,000 units daily     Enlarged thyroid: Thyroid US 23 showed Slightly increased vascularity of the thyroid gland bilaterally;  otherwise unremarkable exam.   -Will plan repeat thyroid US in 6-12 months to monitor; NEXT DUE 2024-2024      Breast Cancer Screening: Last mammogram 3/15/24 No  mammographic evidence of malignancy==>MAMMOGRAM NEXT DUE 3/15/25       Cervical Cancer Screening; next pap due 2/2026  -cont well woman care and pap smears per GYN     Colon Cancer Screening: DUE at age 44 (9/2024)   -discussed with patient who is aware  -colonoscopy ordered for 9/11/24    Counseling:       Medication education:         Education:  The patient is counseled regarding potential side-effects of all new medications        Understanding:  Patient expressed understanding        Adherence:  No barriers to adherence identified        Immunizations Counseling  -flu vaccine received 10/30/23  -TDAP now due==> RECEIVED TODAY  -recommend updated COVID spike vaccine that can be obtained at local pharmacy     FOLLOW-UP: 5 months in Sept 2024     Discussed recommended plan of care with patient. Patient expressed understanding and agreement with plan of care. All of patient's questions were answered at the time. Patient had no additional questions at the time.            Bebe Lemus MD, PhD

## 2024-04-07 NOTE — PATIENT INSTRUCTIONS
It was nice seeing you today.      Please take flexeril 5 mg with food as needed for neck muscle spasms, I recommend to take at night since they can make you feel sleepy and apply heating pad to neck and left shoulder    Please call referral line to schedule: 1)  psychiatry appointment unless you prefer to follow with [facility] or another community based behavioral health provider; 2) physical therapy for neck and shoulder pain     Colon cancer screening either with a colonoscopy or cologuard testing is due when you turn 44 years old in september 2024. I have placed a colonoscopy order for September 2024 after you turn 44 years old; please call to schedule an appt in September.    I recommend receiving the TDAP booster that prevents tetanus and other infectious disease     I recommend the updated COVID vaccine that can be obtained at your local pharmacy     Please schedule a follow up with me in 5 months in September 2024

## 2024-04-08 ENCOUNTER — OFFICE VISIT (OUTPATIENT)
Dept: PRIMARY CARE | Facility: CLINIC | Age: 44
End: 2024-04-08
Payer: COMMERCIAL

## 2024-04-08 VITALS
TEMPERATURE: 96.5 F | OXYGEN SATURATION: 98 % | WEIGHT: 177.8 LBS | SYSTOLIC BLOOD PRESSURE: 128 MMHG | DIASTOLIC BLOOD PRESSURE: 86 MMHG | HEART RATE: 93 BPM | BODY MASS INDEX: 26.26 KG/M2 | RESPIRATION RATE: 20 BRPM

## 2024-04-08 DIAGNOSIS — E55.9 VITAMIN D DEFICIENCY: ICD-10-CM

## 2024-04-08 DIAGNOSIS — M54.2 NECK PAIN: Primary | ICD-10-CM

## 2024-04-08 DIAGNOSIS — M62.838 MUSCLE SPASM: ICD-10-CM

## 2024-04-08 DIAGNOSIS — M54.50 CHRONIC RIGHT-SIDED LOW BACK PAIN WITHOUT SCIATICA: ICD-10-CM

## 2024-04-08 DIAGNOSIS — G89.29 CHRONIC RIGHT-SIDED LOW BACK PAIN WITHOUT SCIATICA: ICD-10-CM

## 2024-04-08 DIAGNOSIS — Z23 ENCOUNTER FOR ADMINISTRATION OF VACCINE: ICD-10-CM

## 2024-04-08 DIAGNOSIS — Z71.85 IMMUNIZATION COUNSELING: ICD-10-CM

## 2024-04-08 DIAGNOSIS — Z12.11 COLON CANCER SCREENING: ICD-10-CM

## 2024-04-08 DIAGNOSIS — M25.551 RIGHT HIP PAIN: ICD-10-CM

## 2024-04-08 DIAGNOSIS — R00.2 HEART PALPITATIONS: ICD-10-CM

## 2024-04-08 DIAGNOSIS — E04.9 ENLARGED THYROID: ICD-10-CM

## 2024-04-08 DIAGNOSIS — M25.512 ACUTE PAIN OF LEFT SHOULDER: ICD-10-CM

## 2024-04-08 DIAGNOSIS — Z65.9 OTHER SOCIAL STRESSOR: ICD-10-CM

## 2024-04-08 PROCEDURE — 90715 TDAP VACCINE 7 YRS/> IM: CPT | Performed by: FAMILY MEDICINE

## 2024-04-08 PROCEDURE — 99214 OFFICE O/P EST MOD 30 MIN: CPT | Performed by: FAMILY MEDICINE

## 2024-04-08 PROCEDURE — 4004F PT TOBACCO SCREEN RCVD TLK: CPT | Performed by: FAMILY MEDICINE

## 2024-04-08 ASSESSMENT — COLUMBIA-SUICIDE SEVERITY RATING SCALE - C-SSRS
2. HAVE YOU ACTUALLY HAD ANY THOUGHTS OF KILLING YOURSELF?: NO
1. IN THE PAST MONTH, HAVE YOU WISHED YOU WERE DEAD OR WISHED YOU COULD GO TO SLEEP AND NOT WAKE UP?: NO
6. HAVE YOU EVER DONE ANYTHING, STARTED TO DO ANYTHING, OR PREPARED TO DO ANYTHING TO END YOUR LIFE?: NO

## 2024-04-08 ASSESSMENT — ENCOUNTER SYMPTOMS
LOSS OF SENSATION IN FEET: 0
DEPRESSION: 0
OCCASIONAL FEELINGS OF UNSTEADINESS: 0

## 2024-04-08 ASSESSMENT — PAIN SCALES - GENERAL: PAINLEVEL: 0-NO PAIN

## 2024-04-17 ENCOUNTER — TREATMENT (OUTPATIENT)
Dept: PHYSICAL THERAPY | Facility: CLINIC | Age: 44
End: 2024-04-17
Payer: COMMERCIAL

## 2024-04-17 DIAGNOSIS — M54.50 CHRONIC RIGHT-SIDED LOW BACK PAIN WITHOUT SCIATICA: ICD-10-CM

## 2024-04-17 DIAGNOSIS — G89.29 CHRONIC RIGHT-SIDED LOW BACK PAIN WITHOUT SCIATICA: ICD-10-CM

## 2024-04-17 PROCEDURE — 97140 MANUAL THERAPY 1/> REGIONS: CPT | Mod: GP

## 2024-04-17 NOTE — PROGRESS NOTES
Physical Therapy Treatment    Patient Name: Estefania Hobbs  MRN: [id number]  Today's Date: 4/18/2024  Time Calculation  Start Time: 1545  Stop Time: 1625  Time Calculation (min): 40 min  PT Therapeutic Procedures Time Entry  Manual Therapy Time Entry: 40    Insurance:  Visit number: 2 of 6  Authorization info: MMO - NO AUTH / [place] / 90% COVERAGE  / OOP $1950 - $568 met / 3/29/24  Insurance Type: Payor: MEDICAL [facility] / Plan: MEDICAL [employer] / Product Type: *No Product type* /     Current Problem   1. Chronic right-sided low back pain without sciatica  Follow Up In Physical Therapy          Subjective   General    Pt reports L UT, neck tightness   Precautions:   none  Pain    3/10  Post Treatment Pain Level 1/10 ( sore from needling)    Objective   Limited cervical flexion, L rotation    Treatments:     Manual:  All risks and benefits of dry needling discussed with pt, all safety protocols were followed:  4 0.30 x 0.50 mm needles placed along L UT    2 0.30 x 0.30 mm needles placed along L sided cervical paraspinals C3-C4  Manual traction  Suboccipital release  Neck flexion, rotation side bending PROM ( stretch)     Assessment   Assessment:    Emphasis of todays treatment was to focus on manual to help with general mobility. Pt tolerated session well no adverse effects from DN. Pt will cont to progress with skilled PT to continue to address above impairments       Plan:    Introduce ore therex next visit     OP EDUCATION:   Heat later at home, reviewed HEP     Goals:   Active       PT Problem       Pt will demonstrate 4+/5 B/L UE strength  (Progressing)       Start:  04/01/24    Expected End:  06/30/24            Pt will demonstrate symmetrical AROM in B/L UEs  (Progressing)       Start:  04/01/24    Expected End:  06/30/24            pt will report no greater than 2/10 pain for 3 consecutive days   (Progressing)       Start:  04/01/24    Expected End:  06/30/24            Pt will be indep with HEP    (Progressing)       Start:  04/01/24    Expected End:  06/30/24

## 2024-04-24 ENCOUNTER — TREATMENT (OUTPATIENT)
Dept: PHYSICAL THERAPY | Facility: CLINIC | Age: 44
End: 2024-04-24
Payer: COMMERCIAL

## 2024-04-24 DIAGNOSIS — M54.50 CHRONIC RIGHT-SIDED LOW BACK PAIN WITHOUT SCIATICA: ICD-10-CM

## 2024-04-24 DIAGNOSIS — G89.29 CHRONIC RIGHT-SIDED LOW BACK PAIN WITHOUT SCIATICA: ICD-10-CM

## 2024-04-24 PROCEDURE — 97140 MANUAL THERAPY 1/> REGIONS: CPT | Mod: GP

## 2024-04-24 PROCEDURE — 97110 THERAPEUTIC EXERCISES: CPT | Mod: GP

## 2024-04-24 NOTE — PROGRESS NOTES
Physical Therapy Treatment    Patient Name: Estefania Hobbs  MRN: [id number]  Today's Date: 4/24/2024  Time Calculation  Start Time: 1615  Stop Time: 1655  Time Calculation (min): 40 min  PT Therapeutic Procedures Time Entry  Manual Therapy Time Entry: 15  Therex 25 min    Insurance:  Visit number: 3 of 6  Authorization info: MMO - NO AUTH / [place] V[place]ITS / 90% COVERAGE  / OOP $1950 - $568 met / 3/29/24  Insurance Type: Payor: MEDICAL [facility] / Plan: MEDICAL [employer] / Product Type: *No Product type* /     Current Problem   1. Chronic right-sided low back pain without sciatica  Follow Up In Physical Therapy          Subjective   General    Pt complains mostly of low back pain soreness today ( R side)  Precautions:   none  Pain    3/10  Post Treatment Pain Level 1/10    Objective   Tenderness noted along R side lumbar paraspinals, Pirirformis, QL    Treatments:   Therex   Bridge 5 x 10 s holds  Bridge + bands 5 x 10 s holds ( green tband)    Sidelying isometric 5 x 10 s holds  Clamshell  Hip abd  Hip abd knee bent     LTR 2 x10  Seated lumbar flexion 2 x10        Manual:  STM over lumbar paraspinals  TrP release lumbar paraspinals QL, Piriformis  PA spinous + transverse process grade IV     Assessment   Assessment:    Emphasis of todays treatment was to focus on manual  + general strengthening to help with general mobility and strength deficits. Pt tolerated session well. Pt will cont to progress with skilled PT to continue to address above impairments       Plan:   Progress therex     OP EDUCATION:   HEP     Goals:   Active       PT Problem       Pt will demonstrate 4+/5 B/L UE strength  (Progressing)       Start:  04/01/24    Expected End:  06/30/24            Pt will demonstrate symmetrical AROM in B/L UEs  (Progressing)       Start:  04/01/24    Expected End:  06/30/24            pt will report no greater than 2/10 pain for 3 consecutive days   (Progressing)       Start:  04/01/24    Expected End:  06/30/24             Pt will be indep with HEP   (Progressing)       Start:  04/01/24    Expected End:  06/30/24

## 2024-05-02 ENCOUNTER — TREATMENT (OUTPATIENT)
Dept: PHYSICAL THERAPY | Facility: CLINIC | Age: 44
End: 2024-05-02
Payer: COMMERCIAL

## 2024-05-02 DIAGNOSIS — G89.29 CHRONIC RIGHT-SIDED LOW BACK PAIN WITHOUT SCIATICA: ICD-10-CM

## 2024-05-02 DIAGNOSIS — M54.50 CHRONIC RIGHT-SIDED LOW BACK PAIN WITHOUT SCIATICA: ICD-10-CM

## 2024-05-02 PROCEDURE — 97110 THERAPEUTIC EXERCISES: CPT | Mod: GP

## 2024-05-03 NOTE — PROGRESS NOTES
Physical Therapy Treatment    Patient Name: Estefania Hobbs  MRN: [id number]  Today's Date: 05/02/2024  Time Calculation  Start Time: 1615  Stop Time: 1645  Time Calculation (min): 30 min  PT Therapeutic Procedures Time Entry  Therex 30 min    Insurance:  Visit number: 4 of 6  Authorization info: MMO - NO AUTH / [place] V[place] / 90% COVERAGE  / OOP $1950 - $568 met / 3/29/24  Insurance Type: Payor: MEDICAL [facility] / Plan: MEDICAL [employer] / Product Type: *No Product type* /     Current Problem   1. Chronic right-sided low back pain without sciatica  Follow Up In Physical Therapy          Subjective   General    Pt reports that symptoms are improving. She would like to take a break from formal PT due to cost of sessions. But would like to continue HEP at home, pt agrees that if symptoms are not improving to schedule for further follow up  Precautions:   none  Pain    3/10  Post Treatment Pain Level 1/10    Objective   Tenderness noted along R side lumbar paraspinals, Pirirformis, QL      Strength  RLE 4/5  LLE 4/5    AROM  WFL BL    Special tests:  Slump (-)  Treatments:  Nustep x 8 mins  Reviewed HEP  Reassessment    Assessment   Assessment:   Pt reports that she would like to continue HEP at home. Scheduled in 3 weeks for a formal follow up to address symptoms at that time     Plan:   Reassessment     OP EDUCATION:   HEP     Goals:   Active       PT Problem       Pt will demonstrate 4+/5 B/L UE strength  (Progressing)       Start:  04/01/24    Expected End:  06/30/24            Pt will demonstrate symmetrical AROM in B/L UEs  (Progressing)       Start:  04/01/24    Expected End:  06/30/24            pt will report no greater than 2/10 pain for 3 consecutive days   (Progressing)       Start:  04/01/24    Expected End:  06/30/24            Pt will be indep with HEP   (Progressing)       Start:  04/01/24    Expected End:  06/30/24

## 2024-05-09 ENCOUNTER — APPOINTMENT (OUTPATIENT)
Dept: PHYSICAL THERAPY | Facility: CLINIC | Age: 44
End: 2024-05-09
Payer: COMMERCIAL

## 2024-05-16 ENCOUNTER — APPOINTMENT (OUTPATIENT)
Dept: PHYSICAL THERAPY | Facility: CLINIC | Age: 44
End: 2024-05-16
Payer: COMMERCIAL

## 2024-05-23 ENCOUNTER — APPOINTMENT (OUTPATIENT)
Dept: PHYSICAL THERAPY | Facility: CLINIC | Age: 44
End: 2024-05-23
Payer: COMMERCIAL

## 2024-06-06 ENCOUNTER — APPOINTMENT (OUTPATIENT)
Dept: PHYSICAL THERAPY | Facility: CLINIC | Age: 44
End: 2024-06-06
Payer: COMMERCIAL

## 2024-09-23 ENCOUNTER — DOCUMENTATION (OUTPATIENT)
Dept: PHYSICAL THERAPY | Facility: CLINIC | Age: 44
End: 2024-09-23
Payer: COMMERCIAL

## 2024-09-23 NOTE — PROGRESS NOTES
Physical Therapy    Discharge Summary    Name: Estefania Hobbs  MRN: [id number]  : 1980  Date: 24    Discharge Summary: PT    Discharge Information: Date of discharge       Rehab Discharge Reason: Achieved all and/or the most significant goals(s)

## 2024-12-27 ENCOUNTER — APPOINTMENT (OUTPATIENT)
Dept: PRIMARY CARE | Facility: CLINIC | Age: 44
End: 2024-12-27
Payer: COMMERCIAL

## 2024-12-27 ENCOUNTER — TELEPHONE (OUTPATIENT)
Dept: PRIMARY CARE | Facility: CLINIC | Age: 44
End: 2024-12-27
Payer: COMMERCIAL

## 2024-12-27 DIAGNOSIS — M25.572 LEFT ANKLE PAIN, UNSPECIFIED CHRONICITY: ICD-10-CM

## 2024-12-27 DIAGNOSIS — M79.672 LEFT FOOT PAIN: Primary | ICD-10-CM

## 2024-12-27 NOTE — TELEPHONE ENCOUNTER
Xray left  ankle and foot, referral to PT, and referral to orthopedic surgery ordered; please call and schedule xrays and appts

## 2024-12-27 NOTE — TELEPHONE ENCOUNTER
Pt was scheduled for appointment today with Dr. Lemus. Appt was rescheduled due to PCP out of office. States she wanted to discuss with PCP ongoing left foot/ankle pain she has had intermittently for a few months now. Does not occur daily. States she believes she rolled her ankle and injured it worse doing yard work in the fall. Wanted to know if xray can be ordered. Please advise.

## 2024-12-28 ENCOUNTER — HOSPITAL ENCOUNTER (OUTPATIENT)
Dept: RADIOLOGY | Facility: HOSPITAL | Age: 44
Discharge: HOME | End: 2024-12-28
Payer: COMMERCIAL

## 2024-12-28 DIAGNOSIS — M25.572 LEFT ANKLE PAIN, UNSPECIFIED CHRONICITY: ICD-10-CM

## 2024-12-28 DIAGNOSIS — M79.672 LEFT FOOT PAIN: ICD-10-CM

## 2024-12-28 PROCEDURE — 73610 X-RAY EXAM OF ANKLE: CPT | Mod: LT

## 2024-12-28 PROCEDURE — 73610 X-RAY EXAM OF ANKLE: CPT | Mod: LEFT SIDE | Performed by: RADIOLOGY

## 2024-12-28 PROCEDURE — 73630 X-RAY EXAM OF FOOT: CPT | Mod: LT

## 2024-12-28 PROCEDURE — 73630 X-RAY EXAM OF FOOT: CPT | Mod: LEFT SIDE | Performed by: RADIOLOGY

## 2025-01-08 ENCOUNTER — OFFICE VISIT (OUTPATIENT)
Dept: ORTHOPEDIC SURGERY | Facility: CLINIC | Age: 45
End: 2025-01-08
Payer: COMMERCIAL

## 2025-01-08 VITALS — BODY MASS INDEX: 26.22 KG/M2 | HEIGHT: 69 IN | WEIGHT: 177 LBS

## 2025-01-08 DIAGNOSIS — M79.672 LEFT FOOT PAIN: Primary | ICD-10-CM

## 2025-01-08 DIAGNOSIS — M21.42 PES PLANUS OF BOTH FEET: ICD-10-CM

## 2025-01-08 DIAGNOSIS — M25.572 LEFT ANKLE PAIN, UNSPECIFIED CHRONICITY: ICD-10-CM

## 2025-01-08 DIAGNOSIS — S93.402A MODERATE LEFT ANKLE SPRAIN, INITIAL ENCOUNTER: ICD-10-CM

## 2025-01-08 DIAGNOSIS — M21.41 PES PLANUS OF BOTH FEET: ICD-10-CM

## 2025-01-08 PROBLEM — M21.40 FLAT FOOT: Status: ACTIVE | Noted: 2025-01-08

## 2025-01-08 PROCEDURE — 99213 OFFICE O/P EST LOW 20 MIN: CPT | Performed by: PHYSICIAN ASSISTANT

## 2025-01-08 PROCEDURE — 1036F TOBACCO NON-USER: CPT | Performed by: PHYSICIAN ASSISTANT

## 2025-01-08 PROCEDURE — 99203 OFFICE O/P NEW LOW 30 MIN: CPT | Performed by: PHYSICIAN ASSISTANT

## 2025-01-08 PROCEDURE — 3008F BODY MASS INDEX DOCD: CPT | Performed by: PHYSICIAN ASSISTANT

## 2025-01-08 ASSESSMENT — ENCOUNTER SYMPTOMS
OCCASIONAL FEELINGS OF UNSTEADINESS: 0
DEPRESSION: 0
LOSS OF SENSATION IN FEET: 0

## 2025-01-08 ASSESSMENT — COLUMBIA-SUICIDE SEVERITY RATING SCALE - C-SSRS
1. IN THE PAST MONTH, HAVE YOU WISHED YOU WERE DEAD OR WISHED YOU COULD GO TO SLEEP AND NOT WAKE UP?: NO
2. HAVE YOU ACTUALLY HAD ANY THOUGHTS OF KILLING YOURSELF?: NO
6. HAVE YOU EVER DONE ANYTHING, STARTED TO DO ANYTHING, OR PREPARED TO DO ANYTHING TO END YOUR LIFE?: NO

## 2025-01-08 ASSESSMENT — LIFESTYLE VARIABLES
HOW MANY STANDARD DRINKS CONTAINING ALCOHOL DO YOU HAVE ON A TYPICAL DAY: PATIENT DOES NOT DRINK
HOW OFTEN DO YOU HAVE SIX OR MORE DRINKS ON ONE OCCASION: NEVER
SKIP TO QUESTIONS 9-10: 1
AUDIT-C TOTAL SCORE: 1
HOW OFTEN DO YOU HAVE A DRINK CONTAINING ALCOHOL: MONTHLY OR LESS

## 2025-01-08 ASSESSMENT — PAIN - FUNCTIONAL ASSESSMENT: PAIN_FUNCTIONAL_ASSESSMENT: 0-10

## 2025-01-08 ASSESSMENT — PAIN SCALES - GENERAL
PAINLEVEL_OUTOF10: 6
PAINLEVEL_OUTOF10: 6

## 2025-01-08 NOTE — PATIENT INSTRUCTIONS
"Thank you for coming to see us today!     Continue to use tylenol for pain control.   Rest, ice and elevate and remember to do exercises.   Wear supportive tennis shoes \"Jerry\" over the counter Dr. Ovalle insert  We are giving you a referral to PT    Follow up  4 weeks    "

## 2025-01-08 NOTE — PROGRESS NOTES
Subjective      Chief Complaint   Patient presents with    Left Foot - Pain        No surgery found     HPI  This 44-year-old woman presents today with left ankle pain (7 out of 10). she states that this left ankle pain has been present since September.  She denies any specific trauma or injury however she states that she may have rolled her ankle in September.  She states that she is a very active person and participates in yoga and walking over 1 mile which does increase her left ankle pain. She saw her primary and x-rays of the left ankle and left foot show no evidence of acute fracture or bone destruction. She denies any previous treatment for her left ankle or previous operations. She presents today for a discussion of further treatment options.       CARDIOLOGY:   Negative for chest pain, shortness of breath.   RESPIRATORY:   Negative for chest pain, shortness of breath.   MUSCULOSKELETAL:   See HPI for details.   NEUROLOGY:   Negative for tingling, numbness, weakness.    Objective    There were no vitals filed for this visit.    Physical Exam  GENERAL:          General Appearance:  This is a pleasant patient with appropriate affect, in no acute distress.   DERMATOLOGY:          Skin: skin at the neck, upper and lower back, and trunk is intact. There is no evidence of skin rash, skin breakdown or ulceration, or atrophic skin change.   EXTREMITIES:          Vascular:  Right, left hands and feet are warm with good color and pulses. Right and left calf and thigh are nontender and nonswollen.   NEUROLOGICAL:          Orientation:  Patient is alert and oriented to person, place, time and situation. Right and left upper and lower extremity motor and sensory examinations are intact.      MUSCULOSKELETAL: Neck: Nontender. No pain with range of motion. Right ankle: Nontender. No pain or limitation with ROM. Mild pes planus of the right foot. Left ankle and foot: There is mild tenderness and swelling at the lateral  malleolus and over the dorsum of the left foot.  There is no tenderness at the medial malleolus.  No pain with gentle flexion extension of the left ankle.  There is mild pain with inversion and eversion of the left ankle.  Grove's is negative and equal bilaterally.  Distal pulses are readily palpable.  Nontender in the left calf.  Neurovascular is intact to light touch.  Patient has mild pes planus of the left foot.  She is seen today walking independently without support.    XR foot left 3+ views    Result Date: 12/30/2024  Interpreted By:  Talisha Myrick, STUDY: XR FOOT LEFT 3+ VIEWS 12/28/2024 8:31 am   INDICATION: Signs/Symptoms:left foot pain   COMPARISON: None available.   ACCESSION NUMBER(S): [id number]   ORDERING CLINICIAN: EMMY PHELAN   TECHNIQUE: Three views of the left foot   FINDINGS: No fracture, dislocation or bony abnormality with the regional soft tissues unremarkable.       Negative exam.   Signed by: Talisha Myrick 12/30/2024 7:28 PM Dictation workstation:   [id number]    XR ankle left 3+ views    Result Date: 12/30/2024  Interpreted By:  Marilee Gómez, STUDY: Left ankle, 3 views.   INDICATION: Signs/Symptoms:left ankle pain.   COMPARISON: None.   ACCESSION NUMBER(S): [id number]   ORDERING CLINICIAN: EMMY PHELAN   FINDINGS: No acute fracture or malalignment. The ankle mortise is normally aligned. No significant degenerative changes. Small sclerotic focus in the talar body likely representing bone island.       1. Unremarkable left ankle radiographs.   MACRO: None.   Signed by: Marilee Gómez 12/30/2024 5:24 PM Dictation workstation:   [id number]       Estefania was seen today for pain.  Diagnoses and all orders for this visit:  Left foot pain  -     Referral to Orthopaedic Surgery  Left ankle pain, unspecified chronicity  -     Referral to Orthopaedic Surgery  Options are discussed with the patient in detail. The patient is given a prescription for physical therapy to evaluate and treat with  gentle strengthening and ROM exercises with modalities as needed. The patient is instructed regarding activity modification and risk for further injury with falling or trauma and to wear OTC shoe inserts with supportive walking shoes, ice, provider directed at home gentle strengthening and ROM exercises, and the appropriate use of Tylenol as needed for pain with its potential adverse reactions and side effects. The patient understands. Follow up in 4 weeks or sooner for MRI evaluation or sooner as needed. Please note that this report has been produced using speech recognition software.  It may contain errors related to grammar, punctuation or spelling.  Electronically signed, but not reviewed.     Suellen Lujan PA-C

## 2025-01-09 ENCOUNTER — APPOINTMENT (OUTPATIENT)
Dept: PRIMARY CARE | Facility: CLINIC | Age: 45
End: 2025-01-09
Payer: COMMERCIAL

## 2025-01-15 ENCOUNTER — HOSPITAL ENCOUNTER (OUTPATIENT)
Dept: GASTROENTEROLOGY | Facility: HOSPITAL | Age: 45
Discharge: HOME | End: 2025-01-15
Payer: COMMERCIAL

## 2025-01-15 ENCOUNTER — ANESTHESIA (OUTPATIENT)
Dept: GASTROENTEROLOGY | Facility: HOSPITAL | Age: 45
End: 2025-01-15
Payer: COMMERCIAL

## 2025-01-15 ENCOUNTER — ANESTHESIA EVENT (OUTPATIENT)
Dept: GASTROENTEROLOGY | Facility: HOSPITAL | Age: 45
End: 2025-01-15
Payer: COMMERCIAL

## 2025-01-15 VITALS
OXYGEN SATURATION: 100 % | BODY MASS INDEX: 26.12 KG/M2 | RESPIRATION RATE: 16 BRPM | HEIGHT: 69 IN | DIASTOLIC BLOOD PRESSURE: 76 MMHG | SYSTOLIC BLOOD PRESSURE: 127 MMHG | WEIGHT: 176.37 LBS | HEART RATE: 67 BPM | TEMPERATURE: 97 F

## 2025-01-15 DIAGNOSIS — Z12.11 COLON CANCER SCREENING: ICD-10-CM

## 2025-01-15 PROBLEM — A63.0 HPV (HUMAN PAPILLOMA VIRUS) ANOGENITAL INFECTION: Status: ACTIVE | Noted: 2025-01-15

## 2025-01-15 LAB — PREGNANCY TEST URINE, POC: NEGATIVE

## 2025-01-15 PROCEDURE — G0121 COLON CA SCRN NOT HI RSK IND: HCPCS | Mod: 74 | Performed by: STUDENT IN AN ORGANIZED HEALTH CARE EDUCATION/TRAINING PROGRAM

## 2025-01-15 PROCEDURE — 7100000009 HC PHASE TWO TIME - INITIAL BASE CHARGE: Performed by: STUDENT IN AN ORGANIZED HEALTH CARE EDUCATION/TRAINING PROGRAM

## 2025-01-15 PROCEDURE — 2500000004 HC RX 250 GENERAL PHARMACY W/ HCPCS (ALT 636 FOR OP/ED): Performed by: NURSE ANESTHETIST, CERTIFIED REGISTERED

## 2025-01-15 PROCEDURE — 3700000002 HC GENERAL ANESTHESIA TIME - EACH INCREMENTAL 1 MINUTE: Performed by: STUDENT IN AN ORGANIZED HEALTH CARE EDUCATION/TRAINING PROGRAM

## 2025-01-15 PROCEDURE — 7100000002 HC RECOVERY ROOM TIME - EACH INCREMENTAL 1 MINUTE: Performed by: STUDENT IN AN ORGANIZED HEALTH CARE EDUCATION/TRAINING PROGRAM

## 2025-01-15 PROCEDURE — 7100000010 HC PHASE TWO TIME - EACH INCREMENTAL 1 MINUTE: Performed by: STUDENT IN AN ORGANIZED HEALTH CARE EDUCATION/TRAINING PROGRAM

## 2025-01-15 PROCEDURE — 81025 URINE PREGNANCY TEST: CPT | Performed by: STUDENT IN AN ORGANIZED HEALTH CARE EDUCATION/TRAINING PROGRAM

## 2025-01-15 PROCEDURE — 7100000001 HC RECOVERY ROOM TIME - INITIAL BASE CHARGE: Performed by: STUDENT IN AN ORGANIZED HEALTH CARE EDUCATION/TRAINING PROGRAM

## 2025-01-15 PROCEDURE — 3700000001 HC GENERAL ANESTHESIA TIME - INITIAL BASE CHARGE: Performed by: STUDENT IN AN ORGANIZED HEALTH CARE EDUCATION/TRAINING PROGRAM

## 2025-01-15 PROCEDURE — 45378 DIAGNOSTIC COLONOSCOPY: CPT | Mod: 52 | Performed by: STUDENT IN AN ORGANIZED HEALTH CARE EDUCATION/TRAINING PROGRAM

## 2025-01-15 PROCEDURE — 99222 1ST HOSP IP/OBS MODERATE 55: CPT | Performed by: STUDENT IN AN ORGANIZED HEALTH CARE EDUCATION/TRAINING PROGRAM

## 2025-01-15 RX ORDER — LIDOCAINE HYDROCHLORIDE 10 MG/ML
0.1 INJECTION, SOLUTION INFILTRATION; PERINEURAL ONCE
Status: DISCONTINUED | OUTPATIENT
Start: 2025-01-15 | End: 2025-01-16 | Stop reason: HOSPADM

## 2025-01-15 RX ORDER — FENTANYL CITRATE 50 UG/ML
50 INJECTION, SOLUTION INTRAMUSCULAR; INTRAVENOUS EVERY 5 MIN PRN
Status: ACTIVE | OUTPATIENT
Start: 2025-01-15 | End: 2025-01-15

## 2025-01-15 RX ORDER — OXYCODONE HYDROCHLORIDE 5 MG/1
5 TABLET ORAL EVERY 4 HOURS PRN
Status: ACTIVE | OUTPATIENT
Start: 2025-01-15 | End: 2025-01-15

## 2025-01-15 RX ORDER — FENTANYL CITRATE 50 UG/ML
25 INJECTION, SOLUTION INTRAMUSCULAR; INTRAVENOUS EVERY 5 MIN PRN
Status: ACTIVE | OUTPATIENT
Start: 2025-01-15 | End: 2025-01-15

## 2025-01-15 RX ORDER — MIDAZOLAM HYDROCHLORIDE 1 MG/ML
INJECTION, SOLUTION INTRAMUSCULAR; INTRAVENOUS AS NEEDED
Status: DISCONTINUED | OUTPATIENT
Start: 2025-01-15 | End: 2025-01-15

## 2025-01-15 RX ORDER — SODIUM CHLORIDE, SODIUM LACTATE, POTASSIUM CHLORIDE, CALCIUM CHLORIDE 600; 310; 30; 20 MG/100ML; MG/100ML; MG/100ML; MG/100ML
INJECTION, SOLUTION INTRAVENOUS CONTINUOUS PRN
Status: DISCONTINUED | OUTPATIENT
Start: 2025-01-15 | End: 2025-01-15

## 2025-01-15 RX ORDER — LIDOCAINE HYDROCHLORIDE 10 MG/ML
INJECTION, SOLUTION INFILTRATION; PERINEURAL AS NEEDED
Status: DISCONTINUED | OUTPATIENT
Start: 2025-01-15 | End: 2025-01-15

## 2025-01-15 RX ORDER — ONDANSETRON HYDROCHLORIDE 2 MG/ML
4 INJECTION, SOLUTION INTRAVENOUS ONCE AS NEEDED
Status: ACTIVE | OUTPATIENT
Start: 2025-01-15 | End: 2025-01-15

## 2025-01-15 RX ORDER — LABETALOL HYDROCHLORIDE 5 MG/ML
5 INJECTION, SOLUTION INTRAVENOUS ONCE AS NEEDED
Status: ACTIVE | OUTPATIENT
Start: 2025-01-15 | End: 2025-01-15

## 2025-01-15 RX ORDER — PROPOFOL 10 MG/ML
INJECTION, EMULSION INTRAVENOUS AS NEEDED
Status: DISCONTINUED | OUTPATIENT
Start: 2025-01-15 | End: 2025-01-15

## 2025-01-15 RX ADMIN — PROPOFOL 50 MG: 10 INJECTION, EMULSION INTRAVENOUS at 09:24

## 2025-01-15 RX ADMIN — PROPOFOL 50 MG: 10 INJECTION, EMULSION INTRAVENOUS at 09:19

## 2025-01-15 RX ADMIN — PROPOFOL 50 MG: 10 INJECTION, EMULSION INTRAVENOUS at 08:48

## 2025-01-15 RX ADMIN — PROPOFOL 50 MG: 10 INJECTION, EMULSION INTRAVENOUS at 09:15

## 2025-01-15 RX ADMIN — PROPOFOL 50 MG: 10 INJECTION, EMULSION INTRAVENOUS at 08:35

## 2025-01-15 RX ADMIN — PROPOFOL 50 MG: 10 INJECTION, EMULSION INTRAVENOUS at 08:23

## 2025-01-15 RX ADMIN — PROPOFOL 50 MG: 10 INJECTION, EMULSION INTRAVENOUS at 09:11

## 2025-01-15 RX ADMIN — PROPOFOL 30 MG: 10 INJECTION, EMULSION INTRAVENOUS at 08:29

## 2025-01-15 RX ADMIN — PROPOFOL 50 MG: 10 INJECTION, EMULSION INTRAVENOUS at 08:55

## 2025-01-15 RX ADMIN — PROPOFOL 70 MG: 10 INJECTION, EMULSION INTRAVENOUS at 08:15

## 2025-01-15 RX ADMIN — MIDAZOLAM 2 MG: 1 INJECTION INTRAMUSCULAR; INTRAVENOUS at 08:12

## 2025-01-15 RX ADMIN — PROPOFOL 50 MG: 10 INJECTION, EMULSION INTRAVENOUS at 09:06

## 2025-01-15 RX ADMIN — PROPOFOL 50 MG: 10 INJECTION, EMULSION INTRAVENOUS at 08:40

## 2025-01-15 RX ADMIN — PROPOFOL 50 MG: 10 INJECTION, EMULSION INTRAVENOUS at 09:03

## 2025-01-15 RX ADMIN — PROPOFOL 50 MG: 10 INJECTION, EMULSION INTRAVENOUS at 09:31

## 2025-01-15 RX ADMIN — PROPOFOL 30 MG: 10 INJECTION, EMULSION INTRAVENOUS at 08:17

## 2025-01-15 RX ADMIN — MIDAZOLAM 2 MG: 1 INJECTION INTRAMUSCULAR; INTRAVENOUS at 08:48

## 2025-01-15 RX ADMIN — SODIUM CHLORIDE, POTASSIUM CHLORIDE, SODIUM LACTATE AND CALCIUM CHLORIDE: 600; 310; 30; 20 INJECTION, SOLUTION INTRAVENOUS at 08:06

## 2025-01-15 RX ADMIN — PROPOFOL 50 MG: 10 INJECTION, EMULSION INTRAVENOUS at 09:36

## 2025-01-15 RX ADMIN — PROPOFOL 50 MG: 10 INJECTION, EMULSION INTRAVENOUS at 09:00

## 2025-01-15 RX ADMIN — PROPOFOL 50 MG: 10 INJECTION, EMULSION INTRAVENOUS at 08:20

## 2025-01-15 RX ADMIN — LIDOCAINE HYDROCHLORIDE 5 ML: 10 INJECTION, SOLUTION INFILTRATION; PERINEURAL at 08:13

## 2025-01-15 RX ADMIN — PROPOFOL 50 MG: 10 INJECTION, EMULSION INTRAVENOUS at 09:08

## 2025-01-15 RX ADMIN — PROPOFOL 70 MG: 10 INJECTION, EMULSION INTRAVENOUS at 08:13

## 2025-01-15 ASSESSMENT — ENCOUNTER SYMPTOMS
CARDIOVASCULAR NEGATIVE: 1
PSYCHIATRIC NEGATIVE: 1
NEUROLOGICAL NEGATIVE: 1
MUSCULOSKELETAL NEGATIVE: 1
ENDOCRINE NEGATIVE: 1
GASTROINTESTINAL NEGATIVE: 1
HEMATOLOGIC/LYMPHATIC NEGATIVE: 1
CONSTITUTIONAL NEGATIVE: 1
ALLERGIC/IMMUNOLOGIC NEGATIVE: 1
RESPIRATORY NEGATIVE: 1
EYES NEGATIVE: 1

## 2025-01-15 ASSESSMENT — PAIN SCALES - GENERAL
PAINLEVEL_OUTOF10: 0 - NO PAIN

## 2025-01-15 ASSESSMENT — PAIN - FUNCTIONAL ASSESSMENT
PAIN_FUNCTIONAL_ASSESSMENT: 0-10

## 2025-01-15 NOTE — DISCHARGE INSTRUCTIONS
Instructions  Patient Instructions after a Colonoscopy      The anesthetics, sedatives or narcotics which were given to you today will be acting in your body for the next 24 hours, so you might feel a little sleepy or groggy.  This feeling should slowly wear off. Carefully read and follow the instructions.      You received sedation today:  - Do not drive or operate any machinery or power tools of any kind.   - No alcoholic beverages today, not even beer or wine.  - Do not make any important decisions or sign any legal documents.  - No over the counter medications that contain alcohol or that may cause drowsiness.  - Do not make any important decisions or sign any legal documents.     While it is common to experience mild to moderate abdominal distention, gas, or belching after your procedure, if any of these symptoms occur following discharge from the GI Lab or within one week of having your procedure, call the [facility] to be advised whether a visit to your nearest Urgent Care or Emergency Department is indicated.  Take this paper with you if you go.      - If you develop an allergic reaction to the medications that were given during your procedure such as difficulty breathing, rash, hives, severe nausea, vomiting or lightheadedness.- If you experience chest pain, shortness of breath, severe abdominal pain, fevers and chills.     -If you develop signs and symptoms of bleeding such as blood in your spit, if your stools turn black, tarry, or bloody     - If you have not urinated within 8 hours following your procedure.- If your IV site becomes painful, red, inflamed, or looks infected.     If you received a biopsy/polypectomy/sphincterotomy the following instructions apply below:     - Do not use Aspirin containing products, non-steroidal medications or anti-coagulants for one week following your procedure. (Examples of these types of medications are: Advil, Arthrotec, Aleve, Coumadin, Ecotrin,  Heparin, Ibuprofen, Indocin, Motrin, Naprosyn, Nuprin, Plavix, Vioxx, and Voltarin, or their generic forms.  This list is not all-inclusive.  Check with your physician or pharmacist before resuming medications.)      - Eat a soft diet today.  Avoid foods that are poorly digested for the next 24 hours.  These foods would include: nuts, beans, lettuce, red meats, and fried foods.       - Start with liquids and advance your diet as tolerated, gradually work up to eating solids.      - Do not have a Barium Study or Enema for one week.     Your physician recommends the additional following instructions:     Colonoscopy: Resume your previous diet, continue your present medications, a repeated colonoscopy will be determined based on pathology result. Return to normal activity tomorrow.

## 2025-01-15 NOTE — ANESTHESIA POSTPROCEDURE EVALUATION
Patient: Estefania Hobbs    Procedure Summary       Date: 01/15/25 Room / Location: [facility]    Anesthesia Start: 0806 Anesthesia Stop: 0943    Procedure: COLONOSCOPY Diagnosis: Colon cancer screening    Scheduled Providers: Lucina Asencio MD; Hayden Wilson MD; TEDDY Luke-CRNA Responsible Provider: Hayden Wilson MD    Anesthesia Type: MAC ASA Status: 2            Anesthesia Type: MAC    Vitals Value Taken Time   /67 01/15/25 0943   Temp 36.2 01/15/25 0943   Pulse 73 01/15/25 0943   Resp 15 01/15/25 0943   SpO2 100 01/15/25 0943       Anesthesia Post Evaluation    Patient location during evaluation: PACU  Patient participation: complete - patient participated  Level of consciousness: awake  Pain management: adequate  Airway patency: patent  Cardiovascular status: acceptable  Respiratory status: acceptable  Hydration status: acceptable  Postoperative Nausea and Vomiting: none        There were no known notable events for this encounter.

## 2025-01-15 NOTE — NURSING NOTE
VSS. IV INFUSING WELL. DENIES PAIN. NO N/V AT THIS TIME. FAMILY PRESENT. 1055 DISCHARGE INSTRUCTIONS REVIEWED AND UNDERSTOOD. IV STOPPED, ANGIO REMOVED.  1110 D/C VIA WHEELCHAIR.

## 2025-01-15 NOTE — H&P
History Of Present Illness  Estefania Hobbs is a 44 y.o. female presenting for initial screening colonoscopy. No previous history of abdominal surgeries or GI history (IBD, cancer)     Past Medical History  Past Medical History:   Diagnosis Date    Abnormal Pap smear of cervix Not sure    HPV (human papilloma virus) infection Not sure       Surgical History  History reviewed. No pertinent surgical history.     Social History  She reports that she quit smoking about 8 months ago. Her smoking use included cigarettes. She started smoking about 16 months ago. She has a 0.3 pack-year smoking history. She has never used smokeless tobacco. She reports current alcohol use of about 4.0 standard drinks of alcohol per week. She reports current drug use. Drugs: Marijuana and Psilocybin.    Family History  Family History   Problem Relation Name Age of Onset    Breast cancer Mother Queta 46    Cancer Mother Queta     Mental illness Mother Queta     Miscarriages / Stillbirths Mother Queta     Heart disease Father Wade     Hypertension Father Wade     Vision loss Father Wade     Arthritis Maternal Grandfather Namar     Arthritis Mother's Sister Trina     Arthritis Mother's Brother Mark     Mental illness Mother's Brother Richy         Allergies  Patient has no known allergies.    Review of Systems   Constitutional: Negative.    HENT: Negative.     Eyes: Negative.    Respiratory: Negative.     Cardiovascular: Negative.    Gastrointestinal: Negative.    Endocrine: Negative.    Genitourinary: Negative.    Musculoskeletal: Negative.    Skin: Negative.    Allergic/Immunologic: Negative.    Neurological: Negative.    Hematological: Negative.    Psychiatric/Behavioral: Negative.          Physical Exam  Vitals and nursing note reviewed. Exam conducted with a chaperone present.   Constitutional:       General: She is not in acute distress.     Appearance: Normal appearance.   HENT:      Head: Normocephalic and atraumatic.      Nose: Nose  "normal.      Mouth/Throat:      Mouth: Mucous membranes are moist.      Pharynx: Oropharynx is clear.   Eyes:      General: No scleral icterus.     Extraocular Movements: Extraocular movements intact.      Conjunctiva/sclera: Conjunctivae normal.   Cardiovascular:      Rate and Rhythm: Normal rate and regular rhythm.   Abdominal:      General: Abdomen is flat. There is no distension.      Tenderness: There is no abdominal tenderness. There is no guarding.      Hernia: No hernia is present.   Musculoskeletal:         General: No swelling or tenderness. Normal range of motion.   Skin:     General: Skin is warm.      Coloration: Skin is not jaundiced.   Neurological:      General: No focal deficit present.      Mental Status: She is alert. Mental status is at baseline.   Psychiatric:         Mood and Affect: Mood normal.         Behavior: Behavior normal.         Thought Content: Thought content normal.         Judgment: Judgment normal.          Last Recorded Vitals  Blood pressure 112/68, temperature 37.3 °C (99.1 °F), temperature source Temporal, resp. rate 16, height 1.75 m (5' 8.9\"), weight 80 kg (176 lb 5.9 oz), SpO2 99%.    Relevant Results        Results for orders placed or performed during the hospital encounter of 01/15/25 (from the past 24 hours)   POCT pregnancy, urine manually resulted   Result Value Ref Range    Preg Test, Ur Negative Negative          Assessment/Plan   Assessment & Plan  Colon cancer screening      44F for first colonoscopy. Risks and benefits discussed, possibility of biopsies/polyps explained.           Lucina Asencio MD    "

## 2025-01-15 NOTE — ANESTHESIA PREPROCEDURE EVALUATION
Patient: Estefania Hobbs    Procedure Information       Date/Time: 01/15/25 0800    Scheduled providers: Lucina Asencio MD; Hayden Wilson MD; KRISHNA Luke    Procedure: COLONOSCOPY    Location: [facility]          Past Medical History:   Diagnosis Date    Abnormal Pap smear of cervix Not sure    HPV (human papilloma virus) infection Not sure        Relevant Problems   ID   (+) HPV (human papilloma virus) anogenital infection       Clinical information reviewed:   Tobacco  Allergies  Meds   Med Hx  Surg Hx  OB Status  Fam Hx  Soc   Hx        NPO Detail:  NPO/Void Status  Carbohydrate Drink Given Prior to Surgery? : N  Date of Last Liquid: 01/15/25  Time of Last Liquid: 0600  Date of Last Solid: 01/14/25  Time of Last Solid: 0900  Last Intake Type: GI prep  Time of Last Void: 0600         Physical Exam    Airway  Mallampati: II  TM distance: >3 FB  Neck ROM: full     Cardiovascular    Dental    Pulmonary    Abdominal            Anesthesia Plan    History of general anesthesia?: yes  History of complications of general anesthesia?: no    ASA 2     MAC     intravenous induction   Anesthetic plan and risks discussed with patient.    Plan discussed with CRNA.

## 2025-01-21 PROBLEM — A63.0 HPV (HUMAN PAPILLOMA VIRUS) ANOGENITAL INFECTION: Status: RESOLVED | Noted: 2025-01-15 | Resolved: 2025-01-21

## 2025-01-22 ENCOUNTER — EVALUATION (OUTPATIENT)
Dept: PHYSICAL THERAPY | Facility: CLINIC | Age: 45
End: 2025-01-22
Payer: COMMERCIAL

## 2025-01-22 DIAGNOSIS — M21.42 PES PLANUS OF BOTH FEET: ICD-10-CM

## 2025-01-22 DIAGNOSIS — M25.572 LEFT ANKLE PAIN, UNSPECIFIED CHRONICITY: ICD-10-CM

## 2025-01-22 DIAGNOSIS — M21.41 PES PLANUS OF BOTH FEET: ICD-10-CM

## 2025-01-22 DIAGNOSIS — M79.672 LEFT FOOT PAIN: Primary | ICD-10-CM

## 2025-01-22 PROCEDURE — 97161 PT EVAL LOW COMPLEX 20 MIN: CPT | Mod: GP

## 2025-01-22 ASSESSMENT — ENCOUNTER SYMPTOMS
PAIN SCALE AT HIGHEST: 8
LOSS OF SENSATION IN FEET: 0
DEPRESSION: 0
PAIN SCALE: 2
PAIN SCALE AT LOWEST: 1
ALLEVIATING FACTORS: ICE
QUALITY: SHARP
OCCASIONAL FEELINGS OF UNSTEADINESS: 0

## 2025-01-22 NOTE — PROGRESS NOTES
Physical Therapy Evaluation and Treatment     Patient Name: Estefania Hobbs  MRN: [id number]  Encounter date: 2025  Time Calculation  Start Time: 730  Stop Time: 819  Time Calculation (min): 49 min  PT Evaluation Time Entry  PT Evaluation (Low) Time Entry: 49    Visit # 1 of 9  Visits/Dates Authorized: : MMO - NO AUTH / $450 DED not met / 90% coins / $1500 OOP not met / [place] VISITS / AVAILITY [id number] / ds 25.   CPT 04413 Not covered. CPT 51452 requires authorization.     Current Problem:   Problem List Items Addressed This Visit             ICD-10-CM       Musculoskeletal and Injuries    Left foot pain - Primary M79.672    Relevant Orders    Follow Up In Physical Therapy    Left ankle pain M25.572    Relevant Orders    Follow Up In Physical Therapy    Flat foot M21.40    Relevant Orders    Follow Up In Physical Therapy     Precautions:      PMHX: low back pain-not an issue, neck pain-not issue  X-ray L ankle 24 per Marilee Gómez MD: 1. Unremarkable left ankle radiographs.   X-ray L foot 24 per Talisha Myrick MD: Negative exam    Subjective Evaluation    History of Present Illness  Onset date: 2024.  Mechanism of injury: Pt reported that she rolled her ankle in 2024. Was walking and rolled her ankle inward. There was soreness, but it did not keep her from her activities. Pt may have re injured in October doing yard work. ROM is not where it use to be. Some days she limps and other days not. Saw orthopedic on 25. Suggested to wear inserts, but not sure if it is beneficial. Unable to do certain positions in yoga due to the position of her ankle     Pain  Current pain ratin  At best pain ratin  At worst pain ratin  Location: L lateral ankle into dorsal foot  Quality: sharp (shooting)  Relieving factors: ice (tylenol, compression, supportive socks)  Progression: no change    Social Support  Patient lives at: [facility] with basement and upstairs. 1 step into  house and 3 steps to main floor. 4 steps to get in from front.  Lives with: significant other and adult children    Hand dominance: left    Treatments  Treatments tried: PT for low back in past. none for ankle.  Patient Goals  Patient goals for therapy: increased motion and decreased pain  Patient goal: Performing yoga without issue. Walk a mile everyday            Objective     Active Range of Motion   Left Knee   Flexion: WFL  Extension: WFL    Right Knee   Flexion: WFL  Extension: WFL  Left Ankle/Foot   Dorsiflexion (ke): WFL  Plantar flexion: WFL  Inversion: 8 degrees with pain  Eversion: WFL    Right Ankle/Foot   Dorsiflexion (ke): WFL  Plantar flexion: WFL  Inversion: 25 degrees   Eversion: WFL    Additional Active Range of Motion Details  B pes planus L>R    Strength/Myotome Testing     Left Ankle/Foot   Dorsiflexion: 5  Plantar flexion: 5  Inversion: 4+ (pain)  Eversion: 5    Right Ankle/Foot   Dorsiflexion: 5  Plantar flexion: 5  Inversion: 5  Eversion: 5    Additional Strength Details  SL heel raise R: 25  SL heel raise L: 22 pain towards end    Tests     Additional Tests Details  Talar tilt: positive on L for pain during neutral and DF inversion   Potential laxity in calcaneofibular ligament on L compared to R      Ambulation     Comments   B pronation     Functional Assessment     Comments  Able to perform SLS on BLEs, increased ankle strategy/[id number] on L              Objective     Active Range of Motion   Left Knee   Flexion: WFL  Extension: WFL    Right Knee   Flexion: WFL  Extension: WFL  Left Ankle/Foot   Dorsiflexion (ke): WFL  Plantar flexion: WFL  Inversion: 8 degrees with pain  Eversion: WFL    Right Ankle/Foot   Dorsiflexion (ke): WFL  Plantar flexion: WFL  Inversion: 25 degrees   Eversion: WFL    Additional Active Range of Motion Details  B pes planus L>R    Strength/Myotome Testing     Left Ankle/Foot   Dorsiflexion: 5  Plantar flexion: 5  Inversion: 4+ (pain)  Eversion: 5    Right  Ankle/Foot   Dorsiflexion: 5  Plantar flexion: 5  Inversion: 5  Eversion: 5    Additional Strength Details  SL heel raise R: 25  SL heel raise L: 22 pain towards end    Tests     Additional Tests Details  Talar tilt: positive on L for pain during neutral and DF inversion   Potential laxity in calcaneofibular ligament on L compared to R      Ambulation     Comments   B pronation     Functional Assessment     Comments  Able to perform SLS on BLEs, increased ankle strategy/[id number] on L       Objective     Active Range of Motion   Left Knee   Flexion: WFL  Extension: WFL    Right Knee   Flexion: WFL  Extension: WFL  Left Ankle/Foot   Dorsiflexion (ke): WFL  Plantar flexion: WFL  Inversion: 8 degrees with pain  Eversion: WFL    Right Ankle/Foot   Dorsiflexion (ke): WFL  Plantar flexion: WFL  Inversion: 25 degrees   Eversion: WFL    Additional Active Range of Motion Details  B pes planus L>R    Strength/Myotome Testing     Left Ankle/Foot   Dorsiflexion: 5  Plantar flexion: 5  Inversion: 4+ (pain)  Eversion: 5    Right Ankle/Foot   Dorsiflexion: 5  Plantar flexion: 5  Inversion: 5  Eversion: 5    Additional Strength Details  SL heel raise R: 25  SL heel raise L: 22 pain towards end    Tests     Additional Tests Details  Talar tilt: positive on L for pain during neutral and DF inversion   Potential laxity in calcaneofibular ligament on L compared to R      Ambulation     Comments   B pronation     Functional Assessment     Comments  Able to perform SLS on BLEs, increased ankle strategy/[id number] on L              Other Measures  Lower Extremity Funtional Score (LEFS): 49    Treatments:     HEP / Access Codes:   Ankle ABCs, PROM into IN/EV: 1-3 times per day     Assessment & Plan     Assessment  Impairments: abnormal or restricted ROM and pain with function  Assessment details: Suspect inversion ankle sprain based on COCO and presentation. Progress strengthening, ROM, and functional activities as tolerated. Monitor for  increased pain or swelling   Barriers to therapy: only able to make 1 time per week per pt  Prognosis: good    Goals  Performing yoga without issue. Walk a mile everyday    Plan  Therapy options: will be seen for skilled physical therapy services  Planned therapy interventions: manual therapy, ADL retraining, balance/weight-bearing training, body mechanics training, flexibility, functional ROM exercises, gait training, home exercise program, neuromuscular re-education, strengthening, stretching and therapeutic activities  Frequency: 2 times per week for 1 week, then 1 time per week.  Duration in visits: 8  Duration in weeks: 7  Treatment plan discussed with: patient  Plan details: Pt educated on HEP, POC, assessment, monitoring swelling, using inserts if beneficial, supportive shoes, icing       Low complexity due to patient's clinical presentation being stable and uncomplicated by any significant comorbidities that may affect rehab tolerance and progression.     Post-Treatment Symptoms:   2/10    Goals:   Active       PT Problem       PT Goal 1       Start:  01/22/25    Expected End:  02/12/25       Pt will be independent with HEP in 3 weeks to ensure improvement outside of PT         PT Goal 2       Start:  01/22/25    Expected End:  02/12/25       Pt will report a pain of 1/10 in 3 weeks to allow pt to be able to perform ADLs and ambulation without difficulty          PT Goal 3       Start:  01/22/25    Expected End:  03/12/25       Pt will improve AROM of L ankle inversion by at least 10 degrees in 7 weeks to allow pt to be able to perform ADLs and ambulation without difficulty          PT Goal 4       Start:  01/22/25    Expected End:  03/12/25       Pt will be able to perform yoga and walk a mile without increased symptoms in 7 weeks to allow pt to be able to perform ADLs and ambulation without difficulty            PT Goal 5       Start:  01/22/25    Expected End:  03/12/25       Pt will improve score on LEFS  by at least 20 points in 7 weeks to allow pt to be able to perform ADLs and ambulation without difficulty

## 2025-01-28 ENCOUNTER — TREATMENT (OUTPATIENT)
Dept: PHYSICAL THERAPY | Facility: CLINIC | Age: 45
End: 2025-01-28
Payer: COMMERCIAL

## 2025-01-28 DIAGNOSIS — M25.572 LEFT ANKLE PAIN, UNSPECIFIED CHRONICITY: ICD-10-CM

## 2025-01-28 DIAGNOSIS — M79.672 LEFT FOOT PAIN: ICD-10-CM

## 2025-01-28 DIAGNOSIS — M21.41 PES PLANUS OF BOTH FEET: ICD-10-CM

## 2025-01-28 DIAGNOSIS — M21.42 PES PLANUS OF BOTH FEET: ICD-10-CM

## 2025-01-28 PROCEDURE — 97110 THERAPEUTIC EXERCISES: CPT | Mod: GP

## 2025-01-28 NOTE — PROGRESS NOTES
Physical Therapy Treatment    Patient Name: Estefania Hobbs  MRN: [id number]  Today's Date: 1/28/2025  Time Calculation  Start Time: 0947  Stop Time: 1025  Time Calculation (min): 38 min  PT Therapeutic Procedures Time Entry  Therapeutic Exercise Time Entry: 38    Insurance:  Visit number: 2 of 9  Authorization info: 2025: MMO - NO AUTH / $450 DED not met / 90% coins / $1500 OOP not met / [place] VISITS / AVAILITY [id number] / ds 1/21/25.   CPT 06945 Not covered. CPT 98781 requires authorization.   Insurance Type: Payor: [employer] [facility] / Plan: [employer] / Product Type: *No Product type* /     Current Problem   1. Left foot pain  Follow Up In Physical Therapy      2. Left ankle pain, unspecified chronicity  Follow Up In Physical Therapy      3. Pes planus of both feet  Follow Up In Physical Therapy          Subjective   General    Pt reported that she felt good after initial eval for about a week, but tried to do more yoga and walking. She felt increased pain, but no increase in swelling.  Noted pain with PROM of IN/EV at home, but has been putting ankle into PF with exercise- adjusted to no PF for HEP  Precautions:     Pain    4/10  Post Treatment Pain Level 2/10    Objective       Treatments:  Therapeutic Exercise:   Nustep lvl5 5mins   Lunges on bosu ball forward/lateral- x10 LLE  Side steps in parallel bars with yellow band around forefeet: x2  Seated ankle 3 way (DF,IN,EV): x15 medium band, noted pain with IN  Seated baps: PF/DF and CW/CCW lvl 2; IN/EV lvl 3 x15  Standing HR: 15 B  Steam boats: x10 L CKC    Assessment   Assessment:    Difficulty/pain with resisted IN, but no discomfort with BAPs board or L CKC activities     Plan:    Continue to progress as tolerated     OP EDUCATION:   Pt educated on technique, HEP-given handout,   HEP/Access Codes:  Access Code: [email or web address]  URL: [email or web address]  Date: 01/28/2025  Prepared by: Luther William    Exercises  - Seated Ankle Alphabet  -  1-3 x daily - 7 x weekly - a-z hold  - Seated Ankle Inversion Eversion PROM  - 1-3 x daily - 7 x weekly - 1-2 sets - 10 reps  Goals:   Active       PT Problem       PT Goal 1 (Progressing)       Start:  01/22/25    Expected End:  02/12/25       Pt will be independent with HEP in 3 weeks to ensure improvement outside of PT         PT Goal 2 (Progressing)       Start:  01/22/25    Expected End:  02/12/25       Pt will report a pain of 1/10 in 3 weeks to allow pt to be able to perform ADLs and ambulation without difficulty          PT Goal 3 (Progressing)       Start:  01/22/25    Expected End:  03/12/25       Pt will improve AROM of L ankle inversion by at least 10 degrees in 7 weeks to allow pt to be able to perform ADLs and ambulation without difficulty          PT Goal 4 (Progressing)       Start:  01/22/25    Expected End:  03/12/25       Pt will be able to perform yoga and walk a mile without increased symptoms in 7 weeks to allow pt to be able to perform ADLs and ambulation without difficulty            PT Goal 5 (Progressing)       Start:  01/22/25    Expected End:  03/12/25       Pt will improve score on LEFS by at least 20 points in 7 weeks to allow pt to be able to perform ADLs and ambulation without difficulty

## 2025-01-31 ENCOUNTER — TREATMENT (OUTPATIENT)
Dept: PHYSICAL THERAPY | Facility: CLINIC | Age: 45
End: 2025-01-31
Payer: COMMERCIAL

## 2025-01-31 DIAGNOSIS — M21.42 PES PLANUS OF BOTH FEET: ICD-10-CM

## 2025-01-31 DIAGNOSIS — M21.41 PES PLANUS OF BOTH FEET: ICD-10-CM

## 2025-01-31 DIAGNOSIS — M25.572 LEFT ANKLE PAIN, UNSPECIFIED CHRONICITY: ICD-10-CM

## 2025-01-31 DIAGNOSIS — M79.672 LEFT FOOT PAIN: ICD-10-CM

## 2025-01-31 PROCEDURE — 97110 THERAPEUTIC EXERCISES: CPT | Mod: GP,CQ

## 2025-01-31 PROCEDURE — 97112 NEUROMUSCULAR REEDUCATION: CPT | Mod: GP,CQ

## 2025-01-31 ASSESSMENT — PAIN SCALES - GENERAL: PAINLEVEL_OUTOF10: 1

## 2025-01-31 ASSESSMENT — PAIN DESCRIPTION - DESCRIPTORS: DESCRIPTORS: ACHING;DULL

## 2025-01-31 NOTE — PROGRESS NOTES
"Physical Therapy Treatment    Patient Name: Estefania Hobbs  MRN: [id number]  Today's Date: 1/31/2025  Time Calculation  Start Time: 1300  Stop Time: 1345  Time Calculation (min): 45 min  PT Therapeutic Procedures Time Entry  Neuromuscular Re-Education Time Entry: 9  Therapeutic Exercise Time Entry: 35    Insurance:  Visit number: 3 of 9  Authorization info: 2025: MMO - NO AUTH / $450 DED not met / 90% coins / $1500 OOP not met / [place] VISITS / AVAILITY [id number] / ds 1/21/25.   CPT 13669 Not covered. CPT 51838 requires authorization.  Insurance Type: Payor: [employer] [facility] / Plan: [employer] / Product Type: *No Product type* /     Current Problem   1. Left foot pain  Follow Up In Physical Therapy      2. Left ankle pain, unspecified chronicity  Follow Up In Physical Therapy      3. Pes planus of both feet  Follow Up In Physical Therapy          Subjective   General   Reason for Referral: Left foot pain, Primary, Left ankle pain, Flat foot  Referred By: Suellen Lujan PA-C  Precautions:     Pain   0-10 (Numeric) Pain Score: 1  Pain Type: Chronic pain  Pain Location: Foot  Pain Orientation: Left  Pain Radiating Towards: Also LB pain 5/10,  Pain Descriptors: Aching, Dull  Post Treatment Pain Level ~2/10    Objective   Minimal LOBs during Neuro activities, initial difficulty performing resisted eversion.    Treatments:  Therapeutic Exercise:  Therapeutic Exercise  Therapeutic Exercise Performed: Yes  Therapeutic Exercise Activity 1: Nustep L5, 6'  Therapeutic Exercise Activity 2: Standing PF 2x10  Therapeutic Exercise Activity 3: Standing DF 2x10  Therapeutic Exercise Activity 4: Gastroc stretches 30\"x3 wedge  Therapeutic Exercise Activity 5: Seated PF with GTB 2x10  Therapeutic Exercise Activity 6: Seated DF with GTB 2x10  Therapeutic Exercise Activity 7: Seated eversion   Therapeutic Exercise Activity 8: Seated inversion GTB 2x10  Therapeutic Exercise Activity 9: Sidestep with GTB above knees " 20'x2 L/R each  Therapeutic Exercise Activity 10: Zig-Zag with GTB above knees 20'x2 F/B each    Neuro Re-ed:   Balance/Neuromuscular Re-Education  Balance/Neuromuscular Re-Education Activity Performed: Yes  Balance/Neuromuscular Re-Education Activity 1: NBOS on foam EO, EC 2' each  Balance/Neuromuscular Re-Education Activity 2: MIP on foam 2'  Balance/Neuromuscular Re-Education Activity 3: A/P WS on faom 3'       Assessment   Assessment:   PT Assessment  PT Assessment Results:  (abnormal or restricted ROM and pain with function  Assessment details: Suspect inversion ankle sprain based on COCO and presentation. Progress strengthening, ROM, and functional activities as tolerated. Monitor for increased pain or swelling)  Rehab Prognosis: Good  Evaluation/Treatment Tolerance: Patient tolerated treatment well  Assessment Comment: Pt demonstrates good balance during Neuro progressions, tolerates mild soreness after treatment progressions.    Plan:   OP PT Plan  Treatment/Interventions:  (manual therapy, ADL retraining, balance/weight-bearing training, body mechanics training, flexibility, functional ROM exercises, gait training, home exercise program, neuromuscular re-education, strengthening, stretching and therapeutic activities)  PT Plan: Skilled PT  PT Frequency: Other (Comment) (2 times per week for 1 week, then 1 time per week.)  Duration: 7 weeks  Number of Treatments Authorized: 8  Rehab Potential: Good  Plan of Care Agreement: Patient    OP EDUCATION:  Outpatient Education  Individual(s) Educated: Patient  Education Provided: Home Exercise Program, Body Mechanics, Anatomy  Patient/Caregiver Demonstrated Understanding: yes  Patient Response to Education: Patient/Caregiver Verbalized Understanding of Information, Patient/Caregiver Performed Return Demonstration of Exercises/Activities, Patient/Caregiver Asked Appropriate Questions    HEP/Access Codes:Handouts    Goals:   Active       PT Problem       PT Goal 1  (Progressing)       Start:  01/22/25    Expected End:  02/12/25       Pt will be independent with HEP in 3 weeks to ensure improvement outside of PT         PT Goal 2 (Progressing)       Start:  01/22/25    Expected End:  02/12/25       Pt will report a pain of 1/10 in 3 weeks to allow pt to be able to perform ADLs and ambulation without difficulty          PT Goal 3 (Progressing)       Start:  01/22/25    Expected End:  03/12/25       Pt will improve AROM of L ankle inversion by at least 10 degrees in 7 weeks to allow pt to be able to perform ADLs and ambulation without difficulty          PT Goal 4 (Progressing)       Start:  01/22/25    Expected End:  03/12/25       Pt will be able to perform yoga and walk a mile without increased symptoms in 7 weeks to allow pt to be able to perform ADLs and ambulation without difficulty            PT Goal 5 (Progressing)       Start:  01/22/25    Expected End:  03/12/25       Pt will improve score on LEFS by at least 20 points in 7 weeks to allow pt to be able to perform ADLs and ambulation without difficulty

## 2025-02-04 NOTE — PROGRESS NOTES
Subjective      Chief Complaint   Patient presents with    Left Foot - Pain        No surgery found     HPI  This 44-year-old woman presents today with left ankle pain (7 out of 10). she states that this left ankle pain has been present since September. She denies any specific trauma or injury however she states that she may have rolled her ankle in September.  She states that she is a very active person and participates in yoga and walking over 1 mile which does increase her left ankle pain. She saw her primary and x-rays of the left ankle and left foot show no evidence of acute fracture or bone destruction. I previously evaluated her and treated her with PT. Despite PT her left ankle pain persists. It is debilitating and impairs her ability to complete her normal activities of daily living including working out and walking. She started wearing OTC shoe inserts and noticed an increase in low back pain. She also describes a tingling sensation at the dorsum of the left foot. She has been evaluated by Dr. Mercer in the past for discogenic low back pain. She presents today for a discussion of further treatment options.       CARDIOLOGY:   Negative for chest pain, shortness of breath.   RESPIRATORY:   Negative for chest pain, shortness of breath.   MUSCULOSKELETAL:   See HPI for details.   NEUROLOGY:   Positive for tingling, numbness, weakness at the dorsum left foot.    Objective    There were no vitals filed for this visit.    Physical Exam  GENERAL:          General Appearance:  This is a pleasant patient with appropriate affect, in no acute distress.   DERMATOLOGY:          Skin: skin at the neck, upper and lower back, and trunk is intact. There is no evidence of skin rash, skin breakdown or ulceration, or atrophic skin change.   EXTREMITIES:          Vascular:  Right, left hands and feet are warm with good color and pulses. Right and left calf and thigh are nontender and nonswollen.   NEUROLOGICAL:           Orientation:  Patient is alert and oriented to person, place, time and situation. Right and left upper and lower extremity motor and sensory examinations are intact.      MUSCULOSKELETAL: Neck: Nontender. No pain with range of motion. Right ankle: Nontender. No pain or limitation with ROM. Mild pes planus of the right foot. Left ankle and foot: There is mild tenderness and swelling at the lateral malleolus and over the dorsum of the left foot.  There is no tenderness at the medial malleolus.  No pain with gentle flexion extension of the left ankle.  There is mild pain with inversion and eversion of the left ankle.  Grove's is negative and equal bilaterally.  Distal pulses are readily palpable.  Nontender in the left calf.  Neurovascular is intact to light touch.  Patient has mild pes planus of the left foot.  She is seen today walking independently without support.    XR foot left 3+ views    Result Date: 12/30/2024  Interpreted By:  Talisha Myrick, STUDY: XR FOOT LEFT 3+ VIEWS 12/28/2024 8:31 am   INDICATION: Signs/Symptoms:left foot pain   COMPARISON: None available.   ACCESSION NUMBER(S): [id number]   ORDERING CLINICIAN: EMMY PHELAN   TECHNIQUE: Three views of the left foot   FINDINGS: No fracture, dislocation or bony abnormality with the regional soft tissues unremarkable.       Negative exam.   Signed by: Talisha Myrick 12/30/2024 7:28 PM Dictation workstation:   [id number]    XR ankle left 3+ views    Result Date: 12/30/2024  Interpreted By:  Marilee Gómez, STUDY: Left ankle, 3 views.   INDICATION: Signs/Symptoms:left ankle pain.   COMPARISON: None.   ACCESSION NUMBER(S): [id number]   ORDERING CLINICIAN: EMMY PHELAN   FINDINGS: No acute fracture or malalignment. The ankle mortise is normally aligned. No significant degenerative changes. Small sclerotic focus in the talar body likely representing bone island.       1. Unremarkable left ankle radiographs.   MACRO: None.   Signed by: Marilee Gómez  12/30/2024 5:24 PM Dictation workstation:   [id number]    XR of the lumbar spine done on 3-9-2024:  IMPRESSION:  Normal radiographs of the lumbar spine       Estefania was seen today for pain.  Diagnoses and all orders for this visit:  Left ankle pain, unspecified chronicity (Primary)  -     MR ankle left wo IV contrast; Future  Pes planus of both feet  Left foot pain  Moderate left ankle sprain, subsequent encounter  -     MR ankle left wo IV contrast; Future  Discogenic low back pain  Options are discussed with the patient in detail. The patient is given a referral for MRI of the left ankle as she continues to have left ankle pain despite the treatment outlined above. The patient is instructed regarding activity modification and risk for further injury with falling or trauma and to wear OTC shoe inserts with supportive walking shoes, ice, provider directed at home gentle strengthening and ROM exercises, and the appropriate use of Tylenol as needed for pain with its potential adverse reactions and side effects. The patient understands. Follow up after MRI evaluation or sooner as needed. Please note that this report has been produced using speech recognition software.  It may contain errors related to grammar, punctuation or spelling.  Electronically signed, but not reviewed.     Suellen Lujan PA-C

## 2025-02-05 ENCOUNTER — OFFICE VISIT (OUTPATIENT)
Dept: ORTHOPEDIC SURGERY | Facility: CLINIC | Age: 45
End: 2025-02-05
Payer: COMMERCIAL

## 2025-02-05 ENCOUNTER — TREATMENT (OUTPATIENT)
Dept: PHYSICAL THERAPY | Facility: CLINIC | Age: 45
End: 2025-02-05
Payer: COMMERCIAL

## 2025-02-05 VITALS — WEIGHT: 176 LBS | BODY MASS INDEX: 26.67 KG/M2 | HEIGHT: 68 IN

## 2025-02-05 DIAGNOSIS — M79.672 LEFT FOOT PAIN: ICD-10-CM

## 2025-02-05 DIAGNOSIS — M51.360 DISCOGENIC LOW BACK PAIN: ICD-10-CM

## 2025-02-05 DIAGNOSIS — M25.572 LEFT ANKLE PAIN, UNSPECIFIED CHRONICITY: ICD-10-CM

## 2025-02-05 DIAGNOSIS — M21.41 PES PLANUS OF BOTH FEET: ICD-10-CM

## 2025-02-05 DIAGNOSIS — S93.402D MODERATE LEFT ANKLE SPRAIN, SUBSEQUENT ENCOUNTER: ICD-10-CM

## 2025-02-05 DIAGNOSIS — M21.42 PES PLANUS OF BOTH FEET: ICD-10-CM

## 2025-02-05 DIAGNOSIS — M25.572 LEFT ANKLE PAIN, UNSPECIFIED CHRONICITY: Primary | ICD-10-CM

## 2025-02-05 PROCEDURE — 97110 THERAPEUTIC EXERCISES: CPT | Mod: GP,CQ

## 2025-02-05 PROCEDURE — 3008F BODY MASS INDEX DOCD: CPT | Performed by: PHYSICIAN ASSISTANT

## 2025-02-05 PROCEDURE — 99213 OFFICE O/P EST LOW 20 MIN: CPT | Performed by: PHYSICIAN ASSISTANT

## 2025-02-05 PROCEDURE — 1036F TOBACCO NON-USER: CPT | Performed by: PHYSICIAN ASSISTANT

## 2025-02-05 ASSESSMENT — PAIN SCALES - GENERAL
PAINLEVEL_OUTOF10: 3

## 2025-02-05 ASSESSMENT — LIFESTYLE VARIABLES
HOW OFTEN DO YOU HAVE SIX OR MORE DRINKS ON ONE OCCASION: NEVER
HOW OFTEN DO YOU HAVE A DRINK CONTAINING ALCOHOL: NEVER

## 2025-02-05 ASSESSMENT — ENCOUNTER SYMPTOMS
LOSS OF SENSATION IN FEET: 0
DEPRESSION: 0
OCCASIONAL FEELINGS OF UNSTEADINESS: 0

## 2025-02-05 ASSESSMENT — PATIENT HEALTH QUESTIONNAIRE - PHQ9
2. FEELING DOWN, DEPRESSED OR HOPELESS: NOT AT ALL
1. LITTLE INTEREST OR PLEASURE IN DOING THINGS: NOT AT ALL
SUM OF ALL RESPONSES TO PHQ9 QUESTIONS 1 AND 2: 0

## 2025-02-05 ASSESSMENT — PAIN - FUNCTIONAL ASSESSMENT: PAIN_FUNCTIONAL_ASSESSMENT: 0-10

## 2025-02-05 NOTE — PROGRESS NOTES
Physical Therapy Treatment    Patient Name: Estefania Hobbs  MRN: [id number]  Today's Date: 2/5/2025  Time Calculation  Start Time: 1054  Stop Time: 1127  Time Calculation (min): 33 min  PT Therapeutic Procedures Time Entry  Neuromuscular Re-Education Time Entry: 2  Therapeutic Exercise Time Entry: 30    Insurance:  Visit number: 4 of 9  Authorization info: 2025: MMO - NO AUTH / $450 DED not met / 90% coins / $1500 OOP not met / [place] / [place] [id number] / ds 1/21/25.   CPT 42045 Not covered. CPT 19227 requires authorization.  Insurance Type: Payor: [employer] [facility] / Plan: [employer] / Product Type: *No Product type* /     Current Problem   1. Left foot pain  Follow Up In Physical Therapy      2. Left ankle pain, unspecified chronicity  Follow Up In Physical Therapy      3. Pes planus of both feet  Follow Up In Physical Therapy          Subjective   General   Reason for Referral: Left foot pain, Primary, Left ankle pain, Flat foot  Referred By: Suellen Lujan PA-C  General Comment: Pt reports increased L foot/ankle pain beginning evening after last visit.  Precautions:  Precautions  STEADI Fall Risk Score (The score of 4 or more indicates an increased risk of falling): 0  Pain   0-10 (Numeric) Pain Score: 3  Pain Type: Chronic pain  Pain Location: Foot  Pain Orientation: Left  Post Treatment Pain Level 3/10    Objective   Non antalgic gait observed after treatment this visit.    Treatments:  Therapeutic Exercise:  Therapeutic Exercise  Therapeutic Exercise Performed: Yes  Therapeutic Exercise Activity 1: Nustep L5, 6'  Therapeutic Exercise Activity 5: Seated PF with GTB 2x10  Therapeutic Exercise Activity 6: Seated DF with GTB 2x10  Therapeutic Exercise Activity 7: Seated eversion   Therapeutic Exercise Activity 8: Seated inversion GTB 2x10  Therapeutic Exercise Activity 9: Sidestep with GTB above knees 20'x2 L/R each  Therapeutic Exercise Activity 10: Zig-Zag with GTB above knees  20'x2 F/B each    Neuro Re-ed:   Balance/Neuromuscular Re-Education  Balance/Neuromuscular Re-Education Activity Performed: Yes  Balance/Neuromuscular Re-Education Activity 2: MIP on foam 2'     Assessment   Assessment:   PT Assessment  PT Assessment Results:  (abnormal or restricted ROM and pain with function  Assessment details: Suspect inversion ankle sprain based on COCO and presentation. Progress strengthening, ROM, and functional activities as tolerated. Monitor for increased pain or swelling)  Rehab Prognosis: Good  Evaluation/Treatment Tolerance: Patient tolerated treatment well  Assessment Comment: Pt tolerates adjusted treatment activities without increased pain level at this time, will procede with HEP activites that do not increase S/S.    Plan:   OP PT Plan  Treatment/Interventions:  (manual therapy, ADL retraining, balance/weight-bearing training, body mechanics training, flexibility, functional ROM exercises, gait training, home exercise program, neuromuscular re-education, strengthening, stretching and therapeutic activities)  PT Plan: Skilled PT  PT Frequency: Other (Comment) (2 times per week for 1 week, then 1 time per week.)  Duration: 7 weeks  Number of Treatments Authorized: 8  Rehab Potential: Good  Plan of Care Agreement: Patient    OP EDUCATION:  Outpatient Education  Individual(s) Educated: Patient  Education Provided: Anatomy, Physiology  Patient/Caregiver Demonstrated Understanding: yes  Patient Response to Education: Patient/Caregiver Verbalized Understanding of Information, Patient/Caregiver Performed Return Demonstration of Exercises/Activities, Patient/Caregiver Asked Appropriate Questions    Goals:   Active       PT Problem       PT Goal 1 (Progressing)       Start:  01/22/25    Expected End:  02/12/25       Pt will be independent with HEP in 3 weeks to ensure improvement outside of PT         PT Goal 2 (Progressing)       Start:  01/22/25    Expected End:  02/12/25       Pt will  report a pain of 1/10 in 3 weeks to allow pt to be able to perform ADLs and ambulation without difficulty          PT Goal 3 (Progressing)       Start:  01/22/25    Expected End:  03/12/25       Pt will improve AROM of L ankle inversion by at least 10 degrees in 7 weeks to allow pt to be able to perform ADLs and ambulation without difficulty          PT Goal 4 (Progressing)       Start:  01/22/25    Expected End:  03/12/25       Pt will be able to perform yoga and walk a mile without increased symptoms in 7 weeks to allow pt to be able to perform ADLs and ambulation without difficulty            PT Goal 5 (Progressing)       Start:  01/22/25    Expected End:  03/12/25       Pt will improve score on LEFS by at least 20 points in 7 weeks to allow pt to be able to perform ADLs and ambulation without difficulty

## 2025-02-05 NOTE — PATIENT INSTRUCTIONS
Thank you for coming to see us today!     Continue to rest, ice, and elevate  Tylenol for pain control  We are ordering an MRI in office today.     Please call central scheduling to schedule your MRI  Once you have a date for your MRI, call our office to schedule a follow up with Dr. Hyman

## 2025-02-12 ENCOUNTER — TREATMENT (OUTPATIENT)
Dept: PHYSICAL THERAPY | Facility: CLINIC | Age: 45
End: 2025-02-12
Payer: COMMERCIAL

## 2025-02-12 DIAGNOSIS — M79.672 LEFT FOOT PAIN: ICD-10-CM

## 2025-02-12 DIAGNOSIS — M21.42 PES PLANUS OF BOTH FEET: ICD-10-CM

## 2025-02-12 DIAGNOSIS — M25.572 LEFT ANKLE PAIN, UNSPECIFIED CHRONICITY: ICD-10-CM

## 2025-02-12 DIAGNOSIS — M21.41 PES PLANUS OF BOTH FEET: ICD-10-CM

## 2025-02-12 PROCEDURE — 97110 THERAPEUTIC EXERCISES: CPT | Mod: GP,CQ

## 2025-02-12 PROCEDURE — 97112 NEUROMUSCULAR REEDUCATION: CPT | Mod: GP,CQ

## 2025-02-12 ASSESSMENT — PAIN SCALES - GENERAL: PAINLEVEL_OUTOF10: 1

## 2025-02-12 NOTE — PROGRESS NOTES
"Physical Therapy Treatment    Patient Name: Estefania Hobbs  MRN: [id number]  Today's Date: 2/12/2025  Time Calculation  Start Time: 0800  Stop Time: 0845  Time Calculation (min): 45 min  PT Therapeutic Procedures Time Entry  Neuromuscular Re-Education Time Entry: 10  Therapeutic Exercise Time Entry: 31    Insurance:  Visit number: 5 of 9  Authorization info: 2025: MMO - NO AUTH / $450 DED not met / 90% coins / $1500 OOP not met / [place] / AVAILITY [id number] / ds 1/21/25.   CPT 16707 Not covered. CPT 86917 requires authorization.  Insurance Type: Payor: [employer] [facility] / Plan: [employer] MED / Product Type: *No Product type* /     Current Problem   1. Left foot pain  Follow Up In Physical Therapy      2. Left ankle pain, unspecified chronicity  Follow Up In Physical Therapy      3. Pes planus of both feet  Follow Up In Physical Therapy          Subjective   General   Reason for Referral: Left foot pain, Primary, Left ankle pain, Flat foot  Referred By: Suellen Lujan PA-C  General Comment: Pt reported improved S/S for \"several days\" sfter last visit, reports increased pain the last couple of days.  Precautions:  Precautions  STEADI Fall Risk Score (The score of 4 or more indicates an increased risk of falling): 0  Pain   0-10 (Numeric) Pain Score: 1  Pain Type: Chronic pain  Pain Location: Foot  Pain Orientation: Left  Post Treatment Pain Level 1/10    Objective   Pt AMB with non antalgic gait on arrival.    Treatments:  Therapeutic Exercise:  Therapeutic Exercise  Therapeutic Exercise Performed: Yes  Therapeutic Exercise Activity 1: Nustep L5, 6'  Therapeutic Exercise Activity 2: Seated PF with GTB 2x10  Therapeutic Exercise Activity 3: Seated DF with GTB 2x10  Therapeutic Exercise Activity 4: Seated eversion   Therapeutic Exercise Activity 5: Seated inversion GTB 2x10  Therapeutic Exercise Activity 6: BAPS L2 A/P 2x10  Therapeutic Exercise Activity 7: BAPS inv/eversion L2 2x10  Therapeutic " Exercise Activity 8: BAPS L2 CW 2x10  Therapeutic Exercise Activity 9: BAPS L2 CCW 2x10  Therapeutic activity:     Neuro Re-ed:   Balance/Neuromuscular Re-Education  Balance/Neuromuscular Re-Education Activity Performed: Yes  Balance/Neuromuscular Re-Education Activity 1: NBOS on foam EO, EC 2' each  Balance/Neuromuscular Re-Education Activity 2: MIP on foam 2'  Balance/Neuromuscular Re-Education Activity 3: BOSU lunge, no hands,      Assessment   Assessment:   PT Assessment  PT Assessment Results:  (abnormal or restricted ROM and pain with function  Assessment details: Suspect inversion ankle sprain based on COCO and presentation. Progress strengthening, ROM, and functional activities as tolerated. Monitor for increased pain or swelling)  Rehab Prognosis: Good  Assessment Comment: Pt tolerates ther ex progressions today without increased S/S on completion.    Plan:   OP PT Plan  PT Plan: Skilled PT  PT Frequency: Other (Comment) (2 times per week for 1 week, then 1 time per week.)  Duration: 7 weeks  Number of Treatments Authorized: 8  Rehab Potential: Good  Plan of Care Agreement: Patient    OP EDUCATION:  Outpatient Education  Individual(s) Educated: Patient  Education Provided: Anatomy, Body Mechanics  Patient/Caregiver Demonstrated Understanding: yes  Patient Response to Education: Patient/Caregiver Verbalized Understanding of Information, Patient/Caregiver Performed Return Demonstration of Exercises/Activities, Patient/Caregiver Asked Appropriate Questions    Goals:   Active       PT Problem       PT Goal 1 (Progressing)       Start:  01/22/25    Expected End:  02/12/25       Pt will be independent with HEP in 3 weeks to ensure improvement outside of PT         PT Goal 2 (Progressing)       Start:  01/22/25    Expected End:  02/12/25       Pt will report a pain of 1/10 in 3 weeks to allow pt to be able to perform ADLs and ambulation without difficulty          PT Goal 3 (Progressing)       Start:  01/22/25     Expected End:  03/12/25       Pt will improve AROM of L ankle inversion by at least 10 degrees in 7 weeks to allow pt to be able to perform ADLs and ambulation without difficulty          PT Goal 4 (Progressing)       Start:  01/22/25    Expected End:  03/12/25       Pt will be able to perform yoga and walk a mile without increased symptoms in 7 weeks to allow pt to be able to perform ADLs and ambulation without difficulty            PT Goal 5 (Progressing)       Start:  01/22/25    Expected End:  03/12/25       Pt will improve score on LEFS by at least 20 points in 7 weeks to allow pt to be able to perform ADLs and ambulation without difficulty

## 2025-02-17 ENCOUNTER — OFFICE VISIT (OUTPATIENT)
Dept: SURGERY | Facility: CLINIC | Age: 45
End: 2025-02-17
Payer: COMMERCIAL

## 2025-02-17 VITALS
HEIGHT: 69 IN | OXYGEN SATURATION: 96 % | TEMPERATURE: 97.9 F | DIASTOLIC BLOOD PRESSURE: 70 MMHG | SYSTOLIC BLOOD PRESSURE: 100 MMHG | HEART RATE: 69 BPM | WEIGHT: 193 LBS | BODY MASS INDEX: 28.58 KG/M2

## 2025-02-17 DIAGNOSIS — Z98.890 H/O COLONOSCOPY: Primary | ICD-10-CM

## 2025-02-17 DIAGNOSIS — Z71.2 ENCOUNTER TO DISCUSS COLONOSCOPY RESULTS: ICD-10-CM

## 2025-02-17 DIAGNOSIS — Z12.11 COLON CANCER SCREENING: ICD-10-CM

## 2025-02-17 PROCEDURE — 99214 OFFICE O/P EST MOD 30 MIN: CPT | Performed by: STUDENT IN AN ORGANIZED HEALTH CARE EDUCATION/TRAINING PROGRAM

## 2025-02-17 PROCEDURE — 3008F BODY MASS INDEX DOCD: CPT | Performed by: STUDENT IN AN ORGANIZED HEALTH CARE EDUCATION/TRAINING PROGRAM

## 2025-02-17 ASSESSMENT — PATIENT HEALTH QUESTIONNAIRE - PHQ9
SUM OF ALL RESPONSES TO PHQ9 QUESTIONS 1 AND 2: 1
1. LITTLE INTEREST OR PLEASURE IN DOING THINGS: NOT AT ALL
10. IF YOU CHECKED OFF ANY PROBLEMS, HOW DIFFICULT HAVE THESE PROBLEMS MADE IT FOR YOU TO DO YOUR WORK, TAKE CARE OF THINGS AT HOME, OR GET ALONG WITH OTHER PEOPLE: NOT DIFFICULT AT ALL
2. FEELING DOWN, DEPRESSED OR HOPELESS: SEVERAL DAYS

## 2025-02-17 ASSESSMENT — PAIN SCALES - GENERAL: PAINLEVEL_OUTOF10: 0-NO PAIN

## 2025-02-18 NOTE — PROGRESS NOTES
History Of Present Illness  Estefania Hobbs is a 44 y.o. female for follow-up after an incomplete colonoscopy.  She scheduled a screening colonoscopy due to her age, she was not having any symptoms.  During the procedure, the cecum could not be reached so she is here to discuss options moving forward.  Continues to deny any change in bowel habits, unanticipated weight loss or blood per rectum.     Past Medical History  She has a past medical history of Abnormal Pap smear of cervix (Not sure), Eczema (Not sure), HPV (human papilloma virus) anogenital infection (01/15/2025), HPV (human papilloma virus) infection (Not sure), and Scoliosis (Feb. 2024).    Surgical History  She has no past surgical history on file.     Social History  She reports that she quit smoking about 9 months ago. Her smoking use included cigarettes. She started smoking about 17 months ago. She has a 0.3 pack-year smoking history. She has never used smokeless tobacco. She reports current alcohol use of about 4.0 standard drinks of alcohol per week. She reports current drug use. Drugs: Marijuana and Psilocybin.    Family History  Family History   Problem Relation Name Age of Onset    Breast cancer Mother Queta 46    Mental illness Mother Queta     Miscarriages / Stillbirths Mother Queta     Heart disease Father Wade     Hypertension Father Wade     Vision loss Father Wade     Arthritis Mother's Sister Trina     Arthritis Mother's Brother Mark     Mental illness Mother's Brother Richy     Arthritis Maternal Grandfather Margarito         Allergies  Patient has no known allergies.    Review of Systems   Constitutional:  Negative for chills, fever and unexpected weight change.   HENT:  Negative for sneezing, sore throat, trouble swallowing and voice change.    Respiratory:  Negative for chest tightness and shortness of breath.    Cardiovascular:  Negative for chest pain and palpitations.   Gastrointestinal:  Negative for abdominal pain, blood in stool,  "diarrhea, nausea and vomiting.   Endocrine: Negative for cold intolerance and heat intolerance.   Genitourinary:  Negative for decreased urine volume, dysuria and hematuria.   Musculoskeletal:  Negative for arthralgias and gait problem.   Skin:  Negative for rash and wound.   Neurological:  Negative for facial asymmetry, speech difficulty and headaches.   Hematological:  Negative for adenopathy. Does not bruise/bleed easily.   Psychiatric/Behavioral:  Negative for self-injury and suicidal ideas.         Physical Exam  Vitals and nursing note reviewed.   Constitutional:       Appearance: Normal appearance.   HENT:      Head: Normocephalic and atraumatic.      Mouth/Throat:      Mouth: Mucous membranes are moist.      Pharynx: Oropharynx is clear.   Eyes:      Extraocular Movements: Extraocular movements intact.      Pupils: Pupils are equal, round, and reactive to light.   Cardiovascular:      Rate and Rhythm: Normal rate and regular rhythm.      Pulses: Normal pulses.   Pulmonary:      Effort: Pulmonary effort is normal.      Breath sounds: Normal breath sounds.   Abdominal:      General: There is no distension.      Palpations: Abdomen is soft.      Tenderness: There is no abdominal tenderness.   Musculoskeletal:      Cervical back: Normal range of motion and neck supple.   Skin:     General: Skin is warm and dry.   Neurological:      General: No focal deficit present.      Mental Status: She is alert and oriented to person, place, and time.   Psychiatric:         Mood and Affect: Mood normal.         Behavior: Behavior normal.          Last Recorded Vitals  Blood pressure 100/70, pulse 69, temperature 36.6 °C (97.9 °F), temperature source Oral, height 1.753 m (5' 9\"), weight 87.5 kg (193 lb), SpO2 96%.    Relevant Results  Incomplete colonoscopy 1/15/2025-diverticulosis seen     Assessment/Plan   Problem List Items Addressed This Visit    None  Visit Diagnoses         Codes    H/O colonoscopy    -  Primary Z98.890 "    Colon cancer screening     Z12.11    Encounter to discuss colonoscopy results     Z71.2          We reviewed her colonoscopy report which does show diverticulosis.  We discussed the etiology and potential signs and symptoms of a diverticulitis flare and the different potential treatment options based on the severity of disease.  Also discussed reasons for an incomplete colonoscopy and ways to proceed.  We discussed a CT colonography however if this is positive she would need to undergo a colonoscopy for direct visualization.  Therefore I recommended that we repeat a colonoscopy at her convenience.  She would like to wait a few months before proceeding, and I think this is reasonable given that she does not have any symptoms or concerning family history.  She will call back in a few months to schedule      Jodee Leung MD

## 2025-02-19 ENCOUNTER — HOSPITAL ENCOUNTER (OUTPATIENT)
Dept: RADIOLOGY | Facility: HOSPITAL | Age: 45
Discharge: HOME | End: 2025-02-19
Payer: COMMERCIAL

## 2025-02-19 ENCOUNTER — APPOINTMENT (OUTPATIENT)
Dept: PHYSICAL THERAPY | Facility: CLINIC | Age: 45
End: 2025-02-19
Payer: COMMERCIAL

## 2025-02-19 DIAGNOSIS — S93.402D MODERATE LEFT ANKLE SPRAIN, SUBSEQUENT ENCOUNTER: ICD-10-CM

## 2025-02-19 DIAGNOSIS — M25.572 LEFT ANKLE PAIN, UNSPECIFIED CHRONICITY: ICD-10-CM

## 2025-02-19 PROCEDURE — 73721 MRI JNT OF LWR EXTRE W/O DYE: CPT | Mod: LEFT SIDE | Performed by: RADIOLOGY

## 2025-02-19 PROCEDURE — 73721 MRI JNT OF LWR EXTRE W/O DYE: CPT | Mod: LT

## 2025-02-23 ASSESSMENT — ENCOUNTER SYMPTOMS
WOUND: 0
PALPITATIONS: 0
HEMATURIA: 0
VOICE CHANGE: 0
DIARRHEA: 0
FACIAL ASYMMETRY: 0
ABDOMINAL PAIN: 0
VOMITING: 0
ADENOPATHY: 0
SORE THROAT: 0
SPEECH DIFFICULTY: 0
FEVER: 0
ARTHRALGIAS: 0
CHILLS: 0
HEADACHES: 0
NAUSEA: 0
UNEXPECTED WEIGHT CHANGE: 0
BRUISES/BLEEDS EASILY: 0
CHEST TIGHTNESS: 0
TROUBLE SWALLOWING: 0
DYSURIA: 0
SHORTNESS OF BREATH: 0
BLOOD IN STOOL: 0

## 2025-02-24 NOTE — PROGRESS NOTES
Subjective      Chief Complaint   Patient presents with    Left Ankle - Pain        No surgery found     HPI  This 44-year-old woman presents today with left ankle pain (up to 6 out of 10) that is worse with and aggravated by prolonged walking and standing. she states that this left ankle pain has been present since September of 2024. She denies any specific trauma or injury however she states that she may have rolled her ankle in September.  She states that she is a very active person and participates in yoga and walking over 1 mile which does increase her left ankle pain. She saw her primary and x-rays of the left ankle and left foot show no evidence of acute fracture or bone destruction. I previously evaluated her and treated her with PT. Despite PT her left ankle pain persists. It is very bothersome and impairs her ability to complete her normal activities of daily living including working out and walking. She started wearing OTC shoe inserts and noticed an increase in low back pain. She also describes a tingling sensation at the dorsum of the left foot. She has been evaluated by Dr. Mercer in the past for discogenic low back pain. She presents today for a discussion of further treatment options. She presents today for follow up of left ankle pain and review MRI results. She is not noticing any improvement; lots of good and bad days. She over did it over the weekend and was in a lot of pain on Monday. She is still working with Physical Therapy-- not noticing anything different yet. The pain can go anywhere from 2-3/10 to 6/10. The ankle feels unstable, catches and locks; some swelling and numbness at times       CARDIOLOGY:   Negative for chest pain, shortness of breath.   RESPIRATORY:   Negative for chest pain, shortness of breath.   MUSCULOSKELETAL:   See HPI for details.   NEUROLOGY:   Positive for tingling, numbness, weakness at the dorsum left foot.    Objective    There were no vitals filed for this  visit.    Physical Exam  GENERAL:          General Appearance:  This is a pleasant patient with appropriate affect, in no acute distress.   DERMATOLOGY:          Skin: skin at the neck, upper and lower back, and trunk is intact. There is no evidence of skin rash, skin breakdown or ulceration, or atrophic skin change.   EXTREMITIES:          Vascular:  Right, left hands and feet are warm with good color and pulses. Right and left calf and thigh are nontender and nonswollen.   NEUROLOGICAL:          Orientation:  Patient is alert and oriented to person, place, time and situation. Right and left upper and lower extremity motor and sensory examinations are intact.      MUSCULOSKELETAL: Neck: Nontender. No pain with range of motion. Right ankle: Nontender. No pain or limitation with ROM. Mild pes planus of the right foot. Left ankle and foot: There is mild tenderness and swelling of the left foot.  There is no tenderness at the medial malleolus.  No pain with gentle flexion extension of the left ankle and foot.  There is full active and passive painless range of motion.  Grove's is negative and equal bilaterally.  Distal pulses are readily palpable.  Nontender in the left calf.  Neurovascular is intact to light touch.  Patient has mild pes planus of the left foot.  The patient is able to bear full weight on the plantar flexed left foot without everting the heel.  She is seen today walking independently without support.    MR ankle left wo IV contrast listed below is reviewed with the patient in the office today    Result Date: 2/19/2025  Interpreted By:  Matteo Alvarado, STUDY: MR ANKLE LEFT WO IV CONTRAST;   INDICATION: Signs/Symptoms:left ankle pain, sprain.   COMPARISON: Plain film radiographs left foot performed on December 28, 2024   ACCESSION NUMBER(S): [id number]   ORDERING CLINICIAN: TIMMY CORADO   TECHNIQUE: Routine multiplanar multisequential MRI left ankle without contrast.   FINDINGS: There is a small  osseous contusion of the lateral margin of the cuboid.   No other fracture seen.   No marrow replacement lesion.   Small 2 mm OCD of the lateral talar dome without findings of instability.   There is no evidence of ligamentous injury.   No evidence of tendon tear.   No focal fluid collections.   No sizeable effusions.   No muscular signal abnormality.       Osseous contusion of the cuboid.   No evidence of other internal derangement left ankle.   Signed by: Matteo Alvarado 2/19/2025 9:14 AM Dictation workstation:   [id number]     XR foot left 3+ views    Result Date: 12/30/2024  Interpreted By:  Talisha Myrick, STUDY: XR FOOT LEFT 3+ VIEWS 12/28/2024 8:31 am   INDICATION: Signs/Symptoms:left foot pain   COMPARISON: None available.   ACCESSION NUMBER(S): [id number]   ORDERING CLINICIAN: EMMY PHELAN   TECHNIQUE: Three views of the left foot   FINDINGS: No fracture, dislocation or bony abnormality with the regional soft tissues unremarkable.       Negative exam.   Signed by: Talisha Myrick 12/30/2024 7:28 PM Dictation workstation:   [id number]    XR ankle left 3+ views    Result Date: 12/30/2024  Interpreted By:  Marilee Gómez, STUDY: Left ankle, 3 views.   INDICATION: Signs/Symptoms:left ankle pain.   COMPARISON: None.   ACCESSION NUMBER(S): [id number]   ORDERING CLINICIAN: EMMY PHELAN   FINDINGS: No acute fracture or malalignment. The ankle mortise is normally aligned. No significant degenerative changes. Small sclerotic focus in the talar body likely representing bone island.       1. Unremarkable left ankle radiographs.   MACRO: None.   Signed by: Marilee Gómez 12/30/2024 5:24 PM Dictation workstation:   [id number]    XR of the lumbar spine done on 3-9-2024:  IMPRESSION:  Normal radiographs of the lumbar spine       Estefania was seen today for pain.  Diagnoses and all orders for this visit:  Left ankle pain, unspecified chronicity  Pes planus of both feet  Left foot pain  Contusion of bone  Options are discussed  with the patient in detail.  The patient is instructed regarding activity modification and risk for further injury with falling or trauma and to wear OTC shoe inserts with supportive walking shoes, ice, provider directed at home gentle strengthening and ROM exercises, and the appropriate use of Tylenol as needed for pain with its potential adverse reactions and side effects. The patient understands. Follow up as needed. Please note that this report has been produced using speech recognition software.  It may contain errors related to grammar, punctuation or spelling.  Electronically signed, but not reviewed.     Riky Hyman MD

## 2025-02-26 ENCOUNTER — TREATMENT (OUTPATIENT)
Dept: PHYSICAL THERAPY | Facility: CLINIC | Age: 45
End: 2025-02-26
Payer: COMMERCIAL

## 2025-02-26 ENCOUNTER — OFFICE VISIT (OUTPATIENT)
Dept: ORTHOPEDIC SURGERY | Facility: CLINIC | Age: 45
End: 2025-02-26
Payer: COMMERCIAL

## 2025-02-26 VITALS — HEIGHT: 69 IN | WEIGHT: 193 LBS | BODY MASS INDEX: 28.58 KG/M2

## 2025-02-26 DIAGNOSIS — M25.572 LEFT ANKLE PAIN, UNSPECIFIED CHRONICITY: ICD-10-CM

## 2025-02-26 DIAGNOSIS — M21.42 PES PLANUS OF BOTH FEET: ICD-10-CM

## 2025-02-26 DIAGNOSIS — T14.8XXA CONTUSION OF BONE: ICD-10-CM

## 2025-02-26 DIAGNOSIS — M21.41 PES PLANUS OF BOTH FEET: ICD-10-CM

## 2025-02-26 DIAGNOSIS — M79.672 LEFT FOOT PAIN: ICD-10-CM

## 2025-02-26 PROCEDURE — 97110 THERAPEUTIC EXERCISES: CPT | Mod: GP,CQ

## 2025-02-26 PROCEDURE — 99213 OFFICE O/P EST LOW 20 MIN: CPT | Performed by: ORTHOPAEDIC SURGERY

## 2025-02-26 PROCEDURE — 3008F BODY MASS INDEX DOCD: CPT | Performed by: ORTHOPAEDIC SURGERY

## 2025-02-26 PROCEDURE — 97140 MANUAL THERAPY 1/> REGIONS: CPT | Mod: GP,CQ

## 2025-02-26 PROCEDURE — 1036F TOBACCO NON-USER: CPT | Performed by: ORTHOPAEDIC SURGERY

## 2025-02-26 ASSESSMENT — LIFESTYLE VARIABLES
HOW OFTEN DO YOU HAVE A DRINK CONTAINING ALCOHOL: NEVER
HOW OFTEN DO YOU HAVE SIX OR MORE DRINKS ON ONE OCCASION: NEVER

## 2025-02-26 ASSESSMENT — PAIN SCALES - GENERAL
PAINLEVEL_OUTOF10: 3
PAINLEVEL_OUTOF10: 1
PAINLEVEL_OUTOF10: 3

## 2025-02-26 ASSESSMENT — ENCOUNTER SYMPTOMS
DEPRESSION: 0
LOSS OF SENSATION IN FEET: 0
OCCASIONAL FEELINGS OF UNSTEADINESS: 0

## 2025-02-26 ASSESSMENT — PAIN - FUNCTIONAL ASSESSMENT: PAIN_FUNCTIONAL_ASSESSMENT: 0-10

## 2025-02-26 NOTE — PATIENT INSTRUCTIONS
Thank you for coming to see us today!     Continue to use tylenol for pain control.   Rest, ice and elevate and remember to do exercises.   Wear high top boots or sneakers     Follow up as needed

## 2025-02-26 NOTE — PROGRESS NOTES
Physical Therapy Treatment    Patient Name: Estefania Hobbs  MRN: [id number]  Today's Date: 2/26/2025  Time Calculation  Start Time: 0800  Stop Time: 0853  Time Calculation (min): 53 min  PT Therapeutic Procedures Time Entry  Manual Therapy Time Entry: 12  Therapeutic Exercise Time Entry: 36    Insurance:  Visit number: 6 of 9  Authorization info: 2025: MMO - NO AUTH / $450 DED not met / 90% coins / $1500 OOP not met / [place] / [place] [id number] / ds 1/21/25.   CPT 39922 Not covered. CPT 22871 requires authorization.    Insurance Type: Payor: [employer] [facility] / Plan: [employer] / Product Type: *No Product type* /     Current Problem   1. Left foot pain  Follow Up In Physical Therapy      2. Left ankle pain, unspecified chronicity  Follow Up In Physical Therapy      3. Pes planus of both feet  Follow Up In Physical Therapy          Subjective   General   Reason for Referral: Left foot pain, Primary, Left ankle pain, Flat foot  Referred By: Suellen Lujan PA-C  General Comment: Pt reports she was able to walk ~1mile on Sunday, however, was very sore Monday and Tuesday.  Precautions:  Precautions  STEADI Fall Risk Score (The score of 4 or more indicates an increased risk of falling): 0  Pain   0-10 (Numeric) Pain Score: 1  Pain Type: Chronic pain  Pain Location: Foot  Pain Orientation: Left  Post Treatment Pain Level 1/10    Objective   Non antalgic gait observed on arrival today.    Treatments:  Therapeutic Exercise:  Therapeutic Exercise  Therapeutic Exercise Performed: Yes  Therapeutic Exercise Activity 1: Nustep L5, 6'  Therapeutic Exercise Activity 2: Standing PF 2x10  Therapeutic Exercise Activity 3: Standing DF 2x10  Therapeutic Exercise Activity 4: Seated eversion BTB 2x10  Therapeutic Exercise Activity 5: Seated inversion BTB 2x10  Therapeutic Exercise Activity 6: BAPS L3 A/P 2x10  Therapeutic Exercise Activity 7: BAPS inv/eversion L3 2x10  Therapeutic Exercise Activity 8: BAPS L3 CW  "2x10  Therapeutic Exercise Activity 9: BAPS L3 CCW 2x10  Therapeutic Exercise Activity 10: Mild inversion stretch 20\"x3    Neuro Re-ed:   Balance/Neuromuscular Re-Education  Balance/Neuromuscular Re-Education Activity Performed: No    Manual:  Manual Therapy  Manual Therapy Performed: Yes  Manual Therapy Activity 1: STM L distal peroneals  Manual Therapy Activity 2: STM L ant tib  Manual Therapy Activity 3: PROM L ankle     Assessment   Assessment:   PT Assessment  Rehab Prognosis: Good  Evaluation/Treatment Tolerance: Patient tolerated treatment well  Assessment Comment: Pt tolerates ther ex progressions with transient increases in S/S, decreased pain level after manual.  Pt demonstrates returning to leisure actvities with increases in S/S after \"overdoing it\", states she is having more good days lately.    Plan:   OP PT Plan  PT Plan: Skilled PT  PT Frequency: Other (Comment) (2 times per week for 1 week, then 1 time per week.)  Duration: 7 weeks  Number of Treatments Authorized: 8  Rehab Potential: Good  Plan of Care Agreement: Patient    OP EDUCATION:  Outpatient Education  Individual(s) Educated: Patient  Education Provided: Body Mechanics, Anatomy  Patient/Caregiver Demonstrated Understanding: yes  Patient Response to Education: Patient/Caregiver Verbalized Understanding of Information, Patient/Caregiver Performed Return Demonstration of Exercises/Activities, Patient/Caregiver Asked Appropriate Questions    Goals:   Active       PT Problem       PT Goal 1 (Progressing)       Start:  01/22/25    Expected End:  02/12/25       Pt will be independent with HEP in 3 weeks to ensure improvement outside of PT         PT Goal 2 (Progressing)       Start:  01/22/25    Expected End:  02/12/25       Pt will report a pain of 1/10 in 3 weeks to allow pt to be able to perform ADLs and ambulation without difficulty          PT Goal 3 (Progressing)       Start:  01/22/25    Expected End:  03/12/25       Pt will improve AROM of " L ankle inversion by at least 10 degrees in 7 weeks to allow pt to be able to perform ADLs and ambulation without difficulty          PT Goal 4 (Progressing)       Start:  01/22/25    Expected End:  03/12/25       Pt will be able to perform yoga and walk a mile without increased symptoms in 7 weeks to allow pt to be able to perform ADLs and ambulation without difficulty            PT Goal 5 (Progressing)       Start:  01/22/25    Expected End:  03/12/25       Pt will improve score on LEFS by at least 20 points in 7 weeks to allow pt to be able to perform ADLs and ambulation without difficulty

## 2025-03-05 ENCOUNTER — TREATMENT (OUTPATIENT)
Dept: PHYSICAL THERAPY | Facility: CLINIC | Age: 45
End: 2025-03-05
Payer: COMMERCIAL

## 2025-03-05 DIAGNOSIS — M25.572 LEFT ANKLE PAIN, UNSPECIFIED CHRONICITY: ICD-10-CM

## 2025-03-05 DIAGNOSIS — M21.41 PES PLANUS OF BOTH FEET: ICD-10-CM

## 2025-03-05 DIAGNOSIS — M79.672 LEFT FOOT PAIN: ICD-10-CM

## 2025-03-05 DIAGNOSIS — M21.42 PES PLANUS OF BOTH FEET: ICD-10-CM

## 2025-03-05 PROCEDURE — 97110 THERAPEUTIC EXERCISES: CPT | Mod: GP,CQ

## 2025-03-05 PROCEDURE — 97112 NEUROMUSCULAR REEDUCATION: CPT | Mod: GP,CQ

## 2025-03-05 ASSESSMENT — PAIN SCALES - GENERAL: PAINLEVEL_OUTOF10: 0 - NO PAIN

## 2025-03-05 NOTE — PROGRESS NOTES
Physical Therapy Treatment    Patient Name: Estefania Hobbs  MRN: [id number]  Today's Date: 3/5/2025  Time Calculation  Start Time: 0800  Stop Time: 0845  Time Calculation (min): 45 min  PT Therapeutic Procedures Time Entry  Neuromuscular Re-Education Time Entry: 8  Therapeutic Exercise Time Entry: 35    Insurance:  Visit number: 7 of 9  Authorization info: 2025: MMO - NO AUTH / $450 DED not met / 90% coins / $1500 OOP not met / [place]IS[place] / AVAILITY [id number] / ds 1/21/25.   CPT 88414 Not covered. CPT 63216 requires authorization.  Insurance Type: Payor: [employer] [facility] / Plan: [employer] / Product Type: *No Product type* /     Current Problem   1. Left foot pain  Follow Up In Physical Therapy      2. Left ankle pain, unspecified chronicity  Follow Up In Physical Therapy      3. Pes planus of both feet  Follow Up In Physical Therapy          Subjective   General   Reason for Referral: Left foot pain, Primary, Left ankle pain, Flat foot  Referred By: Suellen Lujan PA-C  General Comment: Pt reports mostly good days since last visit, no pain on arrival this morning.  Precautions:  Precautions  STEADI Fall Risk Score (The score of 4 or more indicates an increased risk of falling): 0  Pain   0-10 (Numeric) Pain Score: 0 - No pain  Post Treatment Pain Level 0/10    Objective   Non antalgic gait on arrival    Treatments:  Therapeutic Exercise:  Therapeutic Exercise  Therapeutic Exercise Performed: Yes  Therapeutic Exercise Activity 1: Nustep L5, 6'  Therapeutic Exercise Activity 2: Standing PF 2x10  Therapeutic Exercise Activity 3: Standing DF 2x10  Therapeutic Exercise Activity 4: FWD lunges 2x10 L/R each  Therapeutic Exercise Activity 5: Sidestep in guard position with L4 band above knees 20'x3 L/R each  Therapeutic Exercise Activity 6: Zig-Zag with L4 band  Therapeutic Exercise Activity 7: Sit to stand from chair 2x10  Therapeutic Exercise Activity 8: BAPS inv/eversion L3 2x10  Therapeutic Exercise  Activity 9: BAPS L3 A/P 2x10  Therapeutic Exercise Activity 10: BAPS L3 CCW 2x10    Neuro Re-ed:   Balance/Neuromuscular Re-Education  Balance/Neuromuscular Re-Education Activity Performed: Yes  Balance/Neuromuscular Re-Education Activity 1: NBOS on foam 2'  Balance/Neuromuscular Re-Education Activity 2: MIP on foam 2'  Balance/Neuromuscular Re-Education Activity 3: Sidestep on foam beam 6 laps      Assessment   Assessment:   PT Assessment  PT Assessment Results:  (Pt tolerates ther ex and balance progressions without increased initial S/S. Non antalgic gait.)  Rehab Prognosis: Good  Evaluation/Treatment Tolerance: Patient tolerated treatment well  Assessment Comment: Pt tolerates ther ex and neuro progressions without initial exacerbation of S/S, non antLGIC GAIT AFTER TREATMENT.    Plan:   OP PT Plan  Treatment/Interventions:  (Continue strengthening, stretching, neuro activites as tolerated.)  PT Plan: Skilled PT  PT Frequency: Other (Comment) (2 times per week for 1 week, then 1 time per week.)  Duration: 7 weeks  Number of Treatments Authorized: 8  Rehab Potential: Good  Plan of Care Agreement: Patient    OP EDUCATION:  Outpatient Education  Individual(s) Educated: Patient  Education Provided: Body Mechanics  Patient/Caregiver Demonstrated Understanding: yes  Patient Response to Education: Patient/Caregiver Verbalized Understanding of Information, Patient/Caregiver Performed Return Demonstration of Exercises/Activities, Patient/Caregiver Asked Appropriate Questions    Goals:   Active       PT Problem       PT Goal 1 (Progressing)       Start:  01/22/25    Expected End:  02/12/25       Pt will be independent with HEP in 3 weeks to ensure improvement outside of PT         PT Goal 2 (Progressing)       Start:  01/22/25    Expected End:  02/12/25       Pt will report a pain of 1/10 in 3 weeks to allow pt to be able to perform ADLs and ambulation without difficulty          PT Goal 3 (Progressing)       Start:   01/22/25    Expected End:  03/12/25       Pt will improve AROM of L ankle inversion by at least 10 degrees in 7 weeks to allow pt to be able to perform ADLs and ambulation without difficulty          PT Goal 4 (Progressing)       Start:  01/22/25    Expected End:  03/12/25       Pt will be able to perform yoga and walk a mile without increased symptoms in 7 weeks to allow pt to be able to perform ADLs and ambulation without difficulty            PT Goal 5 (Progressing)       Start:  01/22/25    Expected End:  03/12/25       Pt will improve score on LEFS by at least 20 points in 7 weeks to allow pt to be able to perform ADLs and ambulation without difficulty

## 2025-03-12 ENCOUNTER — TREATMENT (OUTPATIENT)
Dept: PHYSICAL THERAPY | Facility: CLINIC | Age: 45
End: 2025-03-12
Payer: COMMERCIAL

## 2025-03-12 DIAGNOSIS — M21.41 PES PLANUS OF BOTH FEET: ICD-10-CM

## 2025-03-12 DIAGNOSIS — M79.672 LEFT FOOT PAIN: ICD-10-CM

## 2025-03-12 DIAGNOSIS — M21.42 PES PLANUS OF BOTH FEET: ICD-10-CM

## 2025-03-12 DIAGNOSIS — M25.572 LEFT ANKLE PAIN, UNSPECIFIED CHRONICITY: ICD-10-CM

## 2025-03-12 PROCEDURE — 97530 THERAPEUTIC ACTIVITIES: CPT | Mod: GP

## 2025-03-12 NOTE — PROGRESS NOTES
Physical Therapy Discharge Report    Patient Name: Estefania Hobbs  MRN: [id number]  Encounter date: 3/12/2025    Time Calculation  Start Time: 0732  Stop Time: 0751  Time Calculation (min): 19 min  PT Therapeutic Procedures Time Entry  Therapeutic Activity Time Entry: 19    Visit # 8 of 9  Visits/Dates Authorized: 2025: MMO - NO AUTH / $450 DED not met / 90% coins / $1500 OOP not met / [place]IS[place] / AVAILITY [id number] / ds 1/21/25.   CPT 32669 Not covered. CPT 00740 requires authorization.     Current Problem:  1. Left foot pain  Follow Up In Physical Therapy      2. Left ankle pain, unspecified chronicity  Follow Up In Physical Therapy      3. Pes planus of both feet  Follow Up In Physical Therapy            Precautions: none           Subjective   General   Pt reported that PT has helped. She has not walked a mile recently, but started yoga with some modifications. Will get pain if she over does things, but is understanding her limitations and how to progress. Pt did fall in shower over the weekend. She did bruise her R leg, but denied needing any further follow up for it. Pt is comfortable continuing her HEP and progression on her own. She would like to be discharged    Pre-Treatment Symptoms:    0/10      Objective        Outcome Measures:  Other Measures  Lower Extremity Funtional Score (LEFS): 62/80  ROM   L ankle inversion AROM: 16      Outcome Measures:  Other Measures  Lower Extremity Funtional Score (LEFS): 62/80    Treatments:      Assessment:    Pt reported being comfortable with discharge.     Post-Treatment Symptoms:    0/10    Plan:    Discharge    Goals:   Active       PT Problem       PT Goal 1 (Met)       Start:  01/22/25    Expected End:  02/12/25    Resolved:  03/12/25    Pt will be independent with HEP in 3 weeks to ensure improvement outside of PT         PT Goal 2 (Progressing)       Start:  01/22/25    Expected End:  02/12/25       Pt will report a pain of 1/10 in 3 weeks to allow pt to be able to  perform ADLs and ambulation without difficulty       Goal Note       0/10; but will get pain if she over does it               PT Goal 3 (Progressing)       Start:  01/22/25    Expected End:  03/12/25       Pt will improve AROM of L ankle inversion by at least 10 degrees in 7 weeks to allow pt to be able to perform ADLs and ambulation without difficulty       Goal Note       L ankle inversion 16 improved by 8 since IE              PT Goal 4 (Progressing)       Start:  01/22/25    Expected End:  03/12/25       Pt will be able to perform yoga and walk a mile without increased symptoms in 7 weeks to allow pt to be able to perform ADLs and ambulation without difficulty         Goal Note       Pt reported that she has not walked a mile recently. She has been performing yoga with modification               PT Goal 5 (Progressing)       Start:  01/22/25    Expected End:  03/12/25       Pt will improve score on LEFS by at least 20 points in 7 weeks to allow pt to be able to perform ADLs and ambulation without difficulty         Goal Note       62/80 improved by 13 since IE

## 2025-03-20 ENCOUNTER — APPOINTMENT (OUTPATIENT)
Dept: PRIMARY CARE | Facility: CLINIC | Age: 45
End: 2025-03-20
Payer: COMMERCIAL

## 2025-04-15 ENCOUNTER — APPOINTMENT (OUTPATIENT)
Dept: PRIMARY CARE | Facility: CLINIC | Age: 45
End: 2025-04-15
Payer: COMMERCIAL

## 2025-05-05 ASSESSMENT — PROMIS GLOBAL HEALTH SCALE
RATE_GENERAL_HEALTH: GOOD
CARRYOUT_PHYSICAL_ACTIVITIES: MOSTLY
EMOTIONAL_PROBLEMS: RARELY
CARRYOUT_SOCIAL_ACTIVITIES: VERY GOOD
RATE_SOCIAL_SATISFACTION: VERY GOOD
RATE_QUALITY_OF_LIFE: GOOD
RATE_MENTAL_HEALTH: GOOD
RATE_PHYSICAL_HEALTH: GOOD
RATE_AVERAGE_PAIN: 2

## 2025-05-05 NOTE — PROGRESS NOTES
Subjective   Estefania Hobbs is a 44 y.o. female who presents for annual physical exam.    HPI:      44 y.o. female  Ex- SMOKER (1/2 ppd x 11 years= 5.5 pack-years; quit 2024)   who presents for annual physical exam.       EM[facility] records reviewed.     Last seen by me on 4/8/2024 for FOLLOW-Up VISIT to DISCUSS and REVIEW TEST RESULTS.  At visit:     History LEFT Sided neck pain and LEFT shoulder pain, improving over time with flexeril, heating pad, and stretching, most consistent with muscle spasms. No known history of neck or shoulder trauma. No red flag signs/sxs. Cervical and left shoulder Xray 3/8/24 per radiology normal   -PT referral ordered 3/8/24  -cont flexeril 5 mg as needed with food every 8 hours for muscle spsams  - patient advised to apply heating pad to lumbar back muscles and OTC NSAIDs q 8h prn or tylenol for discomfort. Patient counseled on limiting NSAID use since long term NSAID use can cause irreversible renal damage  -Emergency Dept precautions discussed and reviewed with patient        RESOLVED Intermittent Right hip and lower back pain after lifting heaving object. Most consistent with muscular spasms. History of right hip trauma (2105). MSK  exam WNL, other than tenderness to palpation of RIGHT lumbar paraspinal muscles. Neuro exam WNL. No red flag Sxs. Suspect muscles spasms and strain.  XR Right HIP/PELVIS 2/12/24: Normal examination of the right hip. sacrum/coccyx XR 2/12/24: Normal examination of the sacrum and coccyx. XR Lumbar spine 2/12/24: Minimal dextroscoliosis of the lumbar spine with mild disc space narrowing at L4-5 and mild-to-moderate disc space narrowing at L5-S1.. XR Lumbar spine 3/8/24: Normal radiographs of the lumbar spine   -referral to PT previously ordered 2/9/24  -previously referred to orthopedic surgery for Minimal dextroscoliosis of the lumbar spine with mild disc space narrowing at L4-5 and mild-to-moderate disc space narrowing at L5-S1.  -cont management per  orthopedic surgery   - patient advised to apply heating pad to lumbar back muscles and OTC NSAIDs q 8h prn or tylenol for discomfort. Patient counseled on limiting NSAID use since long term NSAID use can cause irreversible renal damage  -Emergency Dept precautions discussed and reviewed with patient     History Abnormal urinalysis (3-5 RBC; 24): per patient at the time that UA was provided she was on menses==> REPEAT UA 3/15/24 WNL with no abnormal blood  -will continue to monitor     History of Heart Palpitations: Coronary Ca score= 0 (CT Heart Ca scoring 23). EKG 10/30/23 EK bpm, NSR. No ischemic changes. Followed by cardiology and completed Zio/portable cardiac moinitor that per patient showed no arrhythmias and was normal (no records available for review in EMR)  -cont management per cardiology  -encouraged to limit caffeine intake  -Emergency Dept and 911/EMS precautions discussed and reviewed     Social stressors: improving; saw psychiatry at [facility], [place] and will call to schedule follow-up visit  -previously referral to  psychiatry 10/30/23  -discussed if has difficulty being seen at  psychiatry or if does not accept her insurance, and she prefers to schedule at::  1) [facility] [phone]  OR  2) [facility]: [phone]  -will continue to monitor     Vitamin D deficiency  -cont OTC Vit D3 2,000 units daily     Enlarged thyroid: Thyroid US 23 showed Slightly increased vascularity of the thyroid gland bilaterally;  otherwise unremarkable exam.   -Will plan repeat thyroid US in 6-12 months to monitor; NEXT DUE 2024-2024        Breast Cancer Screening: Last mammogram 3/15/24 No mammographic evidence of malignancy==>MAMMOGRAM NEXT DUE 3/15/25        Cervical Cancer Screening; next pap due 2026  -cont well woman care and pap smears per GYN     Colon Cancer Screening: DUE at age 44 (2024)   -discussed with patient who is aware  -colonoscopy ordered for  9/11/24     Counseling:       Medication education:         Education:  The patient is counseled regarding potential side-effects of all new medications        Understanding:  Patient expressed understanding        Adherence:  No barriers to adherence identified        Immunizations Counseling  -flu vaccine received 10/30/23  -TDAP now due==> RECEIVED TODAY  -recommend updated COVID spike vaccine that can be obtained at local pharmacy     FOLLOW-UP: 5 months in Sept 2024        PMHx:  -dyspareunia  -Heart palpitations  -UTI  -history HR HPV  -Back pain      Left Shoulder XR (3/8/24):  FINDINGS:  Left shoulder, three views      There is no fracture pr there is no dislocation. There are no  degenerative changes.      IMPRESSION:  Normal radiographs of the left shoulder     Cervical spine (3/8/24):  FINDINGS:  C-spine, five views      There is no fracture. There is no spondylolisthesis. No degenerative  change seen. The neural foramina are patent      IMPRESSION:  Normal radiographs of the cervical spine     Lumbar spine (3/8/24):  FINDINGS:  Lumbar spine, five views      There is no fracture. There is no spondylolisthesis. There are no  degenerative changes      IMPRESSION:  Normal radiographs of the lumbar spine        Healthcare Providers:  -cardiology==> previously referred to Dr. Vallabehini for intermittent heart palpitations  -GYN: PAC Newby  -orthopedic surgery: Dr. Mercer; back pain  -Orthopedic surgery Dr. Hyman; left ankle pain  -prior PCP: Dr. Wilson          Preventive Health Services:  -Last physical: 5/7/2025  -last pap: UTD on pap smear, next due 2/2026.  -last mammogram: 3/15/24: No mammographic evidence of malignancy.   NOW DUE  -last colonoscopy: 1/15/2025 ==> + polyps, diverticulosis, incomplete colonoscopy==> repeat due in 5 years (1/2030)  -last STI screening: HIV, syphilis, GC/CT/trich negative 2/12/24  -last Hep C screening: Negative 2/12/24     Immunizations:  - Childhood vaccines:  completed per patient  -COMPLETED COVID primary vaccine series and booster  -flu vaccine: completed 10/2023       Immunization History   Administered Date(s) Administered    COVID-19, mRNA, LNP-S, PF, 30 mcg/0.3 mL dose 11/26/2021    Flu vaccine (IIV4), preservative free *Check age/dose* 10/15/2022, 10/30/2023    Hepatitis B vaccine, adult *Check Product/Dose* 09/18/2008, 10/23/2008, 03/12/2009    Influenza, injectable, quadrivalent 10/23/2020, 11/23/2021    Pfizer COVID-19 vaccine, 12 years and older, (30mcg/0.3mL) (Comirnaty) 11/24/2023, 11/01/2024    Pfizer COVID-19 vaccine, bivalent, age 12 years and older (30 mcg/0.3 mL) 10/15/2022    Pfizer Purple Cap SARS-CoV-2 04/06/2021, 05/04/2021    Tdap vaccine, age 7 year and older (BOOSTRIX, ADACEL) 04/08/2024        INTERVAL HISTORY      - Saw orthopedic surgery for left ankle pain and colorectal surgery Dr. Mosley after an incomplete colonoscopy who recommended to repeat colonoscopy at her convenience,        Today Estefania reports:        -RESOLVED  Left sided neck and LEFT shoulder pain     -intermittent, mild left cuboid pain, improving over time. Completed PT and followed by orthopedic surgery.       -otherwise doing and feeling well     - mood is good today.         Today she reports no other complaints, issues, or problems.     ROS is NEG for HEADACHE, NAUSEA, VOMITING, DIARRHEA, CHEST PAIN, SOB, and BLEEDING.   Review of systems (12) is negative for all systems except for any identified issues in HPI above.        Contraception: use condoms 100% of the time;   Sexually active with  x 13 years.   Denies history STIs.         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[employer]; [employer]  -tobacco use: EX-smoker   -alcohol use: socially; 2-3 drinks (beer or wine) once time weekly  -illicits: DENIES     FHx:  Cancer:  -breast cancer: mother, passed in her 50s of metastatic breast  "cancer   HTN: father  DM: paternal aunts  Heart Disease: father (CAD) s/p stents; mother; maternal  uncles x 3 heart attacks (60s)  Stroke: DENIES  Thyroid Disease: DENIES          Objective     /80   Temp 37.4 °C (99.4 °F)   Ht 1.753 m (5' 9\")   Wt 88.4 kg (194 lb 12.8 oz)   LMP 04/24/2025   BMI 28.77 kg/m²      COMPLETE PHYSICAL EXAM: patient declined chaperone    GENERAL           General Appearance: pleasant, well-appearing, well-developed, well-hydrated, well-nourished, well-groomed, no acute distress        HEENT           NECK supple, Neg for adneopathy no thyroid enlargement or nodules, Oropharynx normal no exudates.  Ears: TMs intact, normal, no effusions       EYES           Pupils: PERRLA, no photophobia.        HEART           Rate and Rhythm regular rate and rhythm. Heart sounds: normal S1S2. Murmurs: none.        LUNGS           Effort: Normal chest wall, no pectus, Normal air entry all fields, Clear to IPPA, RR<16 with no use of accessory muscles.        BREASTS: Patient declined chaperone           Bilaterally: no masses, no nipple retraction, no nipple discharge, no abnormal skin changes. Axilla: no lymphadenopathy.        BACK           General: unremarkable, no spinal tenderness or rashes.        ABDOMEN           General: soft, nondistended, nontender to palpation ormal to inspection, no HSM, neg for LKKS or masses and BSs heard in all quadrants                  LYMPHATICS           Cervical: none. Axillary: none.        MUSCULOSKELETAL           gross abnormalities no gross abnormalities, no joint redness or swelling.        EXTREMITIES           Varicose veins: not present. Pulses: 2+ bilateral. Clubbing: none. Cyanosis: no.        NEUROLOGICAL           Orientation: alert and oriented x 3. Grossly normal: yes. Plantars: downgoing bilaterally. Muscle Bulk: normal . Cranial Nerves: CN's II-XII grossly intact.        PSYCHOLOGY           Affect: appropriate. Mood: pleasant.        " Skin: No rashes.  No lesions    Assessment/Plan   Problem List Items Addressed This Visit    None  Visit Diagnoses         Annual physical exam    -  Primary    Relevant Orders    TSH with reflex to Free T4 if abnormal    Urinalysis with Reflex Microscopic    Comprehensive Metabolic Panel    CBC and Auto Differential      Lipid screening        Relevant Orders    Lipid Panel      Enlarged thyroid        Relevant Orders    TSH with reflex to Free T4 if abnormal      Other social stressor          Vitamin D deficiency        Relevant Orders    Vitamin D 25-Hydroxy,Total (for eval of Vitamin D levels)      Breast cancer screening by mammogram          Colon cancer screening          Cervical cancer screening          Immunization counseling              Annual physical exam: Completed today  - Labs ordered see A/P above    History LEFT Sided neck pain and LEFT shoulder pain, improving over time with flexeril, heating pad, and stretching, most consistent with muscle spasms. No known history of neck or shoulder trauma. No red flag signs/sxs. Cervical and left shoulder Xray 3/8/24 per radiology normal   -PT referral ordered 3/8/24  -cont flexeril 5 mg as needed with food every 8 hours for muscle spsams  - patient advised to apply heating pad to lumbar back muscles and OTC NSAIDs q 8h prn or tylenol for discomfort. Patient counseled on limiting NSAID use since long term NSAID use can cause irreversible renal damage  -Emergency Dept precautions discussed and reviewed with patient        History of Heart Palpitations: Coronary Ca score= 0 (CT Heart Ca scoring 23). EKG 10/30/23 EK bpm, NSR. No ischemic changes. Followed by cardiology and completed Zio/portable cardiac moinitor that per patient showed no arrhythmias and was normal (no records available for review in EMR)  -cont management per cardiology  -encouraged to limit caffeine intake  -Emergency Dept and 911/EMS precautions discussed and reviewed     Social  stressors: improving; saw psychiatry at [facility], [place] and will call to schedule follow-up visit  -previously referral to  psychiatry   -discussed if has difficulty being seen at  psychiatry or if does not accept her insurance, and she prefers to schedule at::  1) [facility] [phone]  OR  2) [facility]: [phone]  -will continue to monitor     Vitamin D deficiency  -Vitamin D level ordered  -cont OTC Vit D3 2,000 units daily     Enlarged thyroid: Thyroid US 11/9/23 showed Slightly increased vascularity of the thyroid gland bilaterally;  otherwise unremarkable exam.   - TSH ordered     Lipid screening  - Lipid panel ordered    Breast Cancer Screening: Now due  - Mammogram ordered        Cervical Cancer Screening; next pap due 2/2026  -cont well woman care and pap smears per GYN    STD screening: Patient declined HIV, syphilis, GC/CT/trichomoniasis screening     Colon Cancer Screening: Colonoscopy completed 1/2025 that showed polyps, diverticulosis however colonoscopy was incomplete.  Saw colorectal doctor Madhu who recommended a repeat colonoscopy to be completed at her convenience   - Patient advised to call and schedule repeat colonoscopy at her convenience     counseling:       Medication education:         Education:  The patient is counseled regarding potential side-effects of all new medications        Understanding:  Patient expressed understanding        Adherence:  No barriers to adherence identified        Immunizations Counseling  -Up-to-date  -recommend updated COVID spike vaccine that can be obtained at local pharmacy     FOLLOW-UP: 4 weeks to discuss and review test results     Discussed recommended plan of care with patient. Patient expressed understanding and agreement with plan of care. All of patient's questions were answered at the time. Patient had no additional questions at the time.                Bebe Lemus MD, PhD

## 2025-05-07 ENCOUNTER — OFFICE VISIT (OUTPATIENT)
Dept: PRIMARY CARE | Facility: CLINIC | Age: 45
End: 2025-05-07
Payer: COMMERCIAL

## 2025-05-07 VITALS
DIASTOLIC BLOOD PRESSURE: 80 MMHG | WEIGHT: 194.8 LBS | TEMPERATURE: 99.4 F | BODY MASS INDEX: 28.85 KG/M2 | SYSTOLIC BLOOD PRESSURE: 134 MMHG | HEIGHT: 69 IN

## 2025-05-07 DIAGNOSIS — Z65.9 OTHER SOCIAL STRESSOR: ICD-10-CM

## 2025-05-07 DIAGNOSIS — Z13.220 LIPID SCREENING: ICD-10-CM

## 2025-05-07 DIAGNOSIS — Z71.85 IMMUNIZATION COUNSELING: ICD-10-CM

## 2025-05-07 DIAGNOSIS — Z00.00 ANNUAL PHYSICAL EXAM: Primary | ICD-10-CM

## 2025-05-07 DIAGNOSIS — Z12.31 BREAST CANCER SCREENING BY MAMMOGRAM: ICD-10-CM

## 2025-05-07 DIAGNOSIS — Z12.4 CERVICAL CANCER SCREENING: ICD-10-CM

## 2025-05-07 DIAGNOSIS — Z12.11 COLON CANCER SCREENING: ICD-10-CM

## 2025-05-07 DIAGNOSIS — E55.9 VITAMIN D DEFICIENCY: ICD-10-CM

## 2025-05-07 DIAGNOSIS — E04.9 ENLARGED THYROID: ICD-10-CM

## 2025-05-07 PROCEDURE — 3008F BODY MASS INDEX DOCD: CPT | Performed by: FAMILY MEDICINE

## 2025-05-07 PROCEDURE — 99396 PREV VISIT EST AGE 40-64: CPT | Performed by: FAMILY MEDICINE

## 2025-05-07 PROCEDURE — 1036F TOBACCO NON-USER: CPT | Performed by: FAMILY MEDICINE

## 2025-07-18 LAB
25(OH)D3+25(OH)D2 SERPL-MCNC: 38 NG/ML (ref 30–100)
ALBUMIN SERPL-MCNC: 4.4 G/DL (ref 3.6–5.1)
ALP SERPL-CCNC: 54 U/L (ref 31–125)
ALT SERPL-CCNC: 16 U/L (ref 6–29)
ANION GAP SERPL CALCULATED.4IONS-SCNC: 9 MMOL/L (CALC) (ref 7–17)
APPEARANCE UR: CLEAR
AST SERPL-CCNC: 17 U/L (ref 10–35)
BASOPHILS # BLD AUTO: 42 CELLS/UL (ref 0–200)
BASOPHILS NFR BLD AUTO: 0.5 %
BILIRUB SERPL-MCNC: 0.6 MG/DL (ref 0.2–1.2)
BILIRUB UR QL STRIP: NEGATIVE
BUN SERPL-MCNC: 7 MG/DL (ref 7–25)
CALCIUM SERPL-MCNC: 9.4 MG/DL (ref 8.6–10.2)
CHLORIDE SERPL-SCNC: 101 MMOL/L (ref 98–110)
CHOLEST SERPL-MCNC: 180 MG/DL
CHOLEST/HDLC SERPL: 2.7 (CALC)
CO2 SERPL-SCNC: 28 MMOL/L (ref 20–32)
COLOR UR: YELLOW
CREAT SERPL-MCNC: 0.79 MG/DL (ref 0.5–0.99)
EGFRCR SERPLBLD CKD-EPI 2021: 94 ML/MIN/1.73M2
EOSINOPHIL # BLD AUTO: 118 CELLS/UL (ref 15–500)
EOSINOPHIL NFR BLD AUTO: 1.4 %
ERYTHROCYTE [DISTWIDTH] IN BLOOD BY AUTOMATED COUNT: 12.7 % (ref 11–15)
GLUCOSE SERPL-MCNC: 93 MG/DL (ref 65–99)
GLUCOSE UR QL STRIP: NEGATIVE
HCT VFR BLD AUTO: 43.4 % (ref 35–45)
HDLC SERPL-MCNC: 67 MG/DL
HGB BLD-MCNC: 14.2 G/DL (ref 11.7–15.5)
HGB UR QL STRIP: NEGATIVE
KETONES UR QL STRIP: NEGATIVE
LDLC SERPL CALC-MCNC: 96 MG/DL (CALC)
LEUKOCYTE ESTERASE UR QL STRIP: NEGATIVE
LYMPHOCYTES # BLD AUTO: 1840 CELLS/UL (ref 850–3900)
LYMPHOCYTES NFR BLD AUTO: 21.9 %
MCH RBC QN AUTO: 31.4 PG (ref 27–33)
MCHC RBC AUTO-ENTMCNC: 32.7 G/DL (ref 32–36)
MCV RBC AUTO: 96 FL (ref 80–100)
MONOCYTES # BLD AUTO: 773 CELLS/UL (ref 200–950)
MONOCYTES NFR BLD AUTO: 9.2 %
NEUTROPHILS # BLD AUTO: 5628 CELLS/UL (ref 1500–7800)
NEUTROPHILS NFR BLD AUTO: 67 %
NITRITE UR QL STRIP: NEGATIVE
NONHDLC SERPL-MCNC: 113 MG/DL (CALC)
PH UR STRIP: 7.5 [PH] (ref 5–8)
PLATELET # BLD AUTO: 409 THOUSAND/UL (ref 140–400)
PMV BLD REES-ECKER: 9.4 FL (ref 7.5–12.5)
POTASSIUM SERPL-SCNC: 4.1 MMOL/L (ref 3.5–5.3)
PROT SERPL-MCNC: 7.4 G/DL (ref 6.1–8.1)
PROT UR QL STRIP: NEGATIVE
RBC # BLD AUTO: 4.52 MILLION/UL (ref 3.8–5.1)
SODIUM SERPL-SCNC: 138 MMOL/L (ref 135–146)
SP GR UR STRIP: 1 (ref 1–1.03)
TRIGL SERPL-MCNC: 78 MG/DL
TSH SERPL-ACNC: 2.23 MIU/L
WBC # BLD AUTO: 8.4 THOUSAND/UL (ref 3.8–10.8)

## 2025-08-12 ENCOUNTER — DOCUMENTATION (OUTPATIENT)
Dept: PHYSICAL THERAPY | Facility: CLINIC | Age: 45
End: 2025-08-12
Payer: COMMERCIAL

## 2025-08-18 ENCOUNTER — HOSPITAL ENCOUNTER (OUTPATIENT)
Dept: RADIOLOGY | Facility: HOSPITAL | Age: 45
Discharge: HOME | End: 2025-08-18
Payer: COMMERCIAL

## 2025-08-18 DIAGNOSIS — Z12.31 BREAST CANCER SCREENING BY MAMMOGRAM: ICD-10-CM

## 2025-08-18 PROCEDURE — 77067 SCR MAMMO BI INCL CAD: CPT | Performed by: STUDENT IN AN ORGANIZED HEALTH CARE EDUCATION/TRAINING PROGRAM

## 2025-08-18 PROCEDURE — 77063 BREAST TOMOSYNTHESIS BI: CPT | Performed by: STUDENT IN AN ORGANIZED HEALTH CARE EDUCATION/TRAINING PROGRAM

## 2025-08-18 PROCEDURE — 77063 BREAST TOMOSYNTHESIS BI: CPT

## 2025-09-03 ENCOUNTER — TELEMEDICINE CLINICAL SUPPORT (OUTPATIENT)
Dept: SURGERY | Facility: CLINIC | Age: 45
End: 2025-09-03
Payer: COMMERCIAL

## 2025-09-03 VITALS — WEIGHT: 190 LBS | BODY MASS INDEX: 28.14 KG/M2 | HEIGHT: 69 IN

## 2025-09-03 DIAGNOSIS — Z12.12 SCREENING FOR COLORECTAL CANCER: ICD-10-CM

## 2025-09-03 DIAGNOSIS — Z12.11 SCREENING FOR COLORECTAL CANCER: ICD-10-CM

## 2025-09-03 RX ORDER — DOXYCYCLINE 50 MG/1
50 TABLET ORAL DAILY
COMMUNITY

## 2025-09-03 ASSESSMENT — ENCOUNTER SYMPTOMS
LOSS OF SENSATION IN FEET: 0
OCCASIONAL FEELINGS OF UNSTEADINESS: 0
DEPRESSION: 0

## 2025-09-03 ASSESSMENT — PAIN SCALES - GENERAL: PAINLEVEL_OUTOF10: 0-NO PAIN
